# Patient Record
Sex: MALE | Race: WHITE | HISPANIC OR LATINO
[De-identification: names, ages, dates, MRNs, and addresses within clinical notes are randomized per-mention and may not be internally consistent; named-entity substitution may affect disease eponyms.]

---

## 2017-01-25 ENCOUNTER — APPOINTMENT (OUTPATIENT)
Dept: OPHTHALMOLOGY | Facility: CLINIC | Age: 47
End: 2017-01-25

## 2017-01-25 DIAGNOSIS — H40.003 PREGLAUCOMA, UNSPECIFIED, BILATERAL: ICD-10-CM

## 2017-01-25 DIAGNOSIS — L71.9 UNSPECIFIED BLEPHARITIS LEFT EYE, UNSPECIFIED EYELID: ICD-10-CM

## 2017-01-25 DIAGNOSIS — H00.15 CHALAZION LEFT LOWER EYELID: ICD-10-CM

## 2017-01-25 DIAGNOSIS — H01.006 UNSPECIFIED BLEPHARITIS LEFT EYE, UNSPECIFIED EYELID: ICD-10-CM

## 2017-01-25 DIAGNOSIS — H01.003 UNSPECIFIED BLEPHARITIS LEFT EYE, UNSPECIFIED EYELID: ICD-10-CM

## 2017-03-15 ENCOUNTER — APPOINTMENT (OUTPATIENT)
Dept: OPHTHALMOLOGY | Facility: CLINIC | Age: 47
End: 2017-03-15

## 2018-10-26 ENCOUNTER — INPATIENT (INPATIENT)
Facility: HOSPITAL | Age: 48
LOS: 11 days | Discharge: HOME | DRG: 463 | End: 2018-11-07
Attending: ORTHOPAEDIC SURGERY | Admitting: ORTHOPAEDIC SURGERY
Payer: COMMERCIAL

## 2018-10-26 VITALS
HEART RATE: 73 BPM | RESPIRATION RATE: 18 BRPM | SYSTOLIC BLOOD PRESSURE: 154 MMHG | TEMPERATURE: 98 F | OXYGEN SATURATION: 98 % | DIASTOLIC BLOOD PRESSURE: 81 MMHG

## 2018-10-26 DIAGNOSIS — S82.92XA UNSPECIFIED FRACTURE OF LEFT LOWER LEG, INITIAL ENCOUNTER FOR CLOSED FRACTURE: ICD-10-CM

## 2018-10-27 LAB
ANION GAP SERPL CALC-SCNC: 12 MMOL/L — SIGNIFICANT CHANGE UP (ref 5–17)
APTT BLD: 33.7 SEC — SIGNIFICANT CHANGE UP (ref 27.5–37.4)
BASOPHILS # BLD AUTO: 0 K/UL — SIGNIFICANT CHANGE UP (ref 0–0.2)
BASOPHILS NFR BLD AUTO: 0.7 % — SIGNIFICANT CHANGE UP (ref 0–2)
BLD GP AB SCN SERPL QL: NEGATIVE — SIGNIFICANT CHANGE UP
BUN SERPL-MCNC: 18 MG/DL — SIGNIFICANT CHANGE UP (ref 7–23)
CALCIUM SERPL-MCNC: 10.1 MG/DL — SIGNIFICANT CHANGE UP (ref 8.4–10.5)
CHLORIDE SERPL-SCNC: 98 MMOL/L — SIGNIFICANT CHANGE UP (ref 96–108)
CO2 SERPL-SCNC: 28 MMOL/L — SIGNIFICANT CHANGE UP (ref 22–31)
CREAT SERPL-MCNC: 0.96 MG/DL — SIGNIFICANT CHANGE UP (ref 0.5–1.3)
EOSINOPHIL # BLD AUTO: 0.1 K/UL — SIGNIFICANT CHANGE UP (ref 0–0.5)
EOSINOPHIL NFR BLD AUTO: 0.9 % — SIGNIFICANT CHANGE UP (ref 0–6)
GLUCOSE SERPL-MCNC: 101 MG/DL — HIGH (ref 70–99)
HCT VFR BLD CALC: 38.6 % — LOW (ref 39–50)
HGB BLD-MCNC: 13.3 G/DL — SIGNIFICANT CHANGE UP (ref 13–17)
INR BLD: 1.06 RATIO — SIGNIFICANT CHANGE UP (ref 0.88–1.16)
LYMPHOCYTES # BLD AUTO: 2.2 K/UL — SIGNIFICANT CHANGE UP (ref 1–3.3)
LYMPHOCYTES # BLD AUTO: 37.2 % — SIGNIFICANT CHANGE UP (ref 13–44)
MCHC RBC-ENTMCNC: 32.1 PG — SIGNIFICANT CHANGE UP (ref 27–34)
MCHC RBC-ENTMCNC: 34.3 GM/DL — SIGNIFICANT CHANGE UP (ref 32–36)
MCV RBC AUTO: 93.4 FL — SIGNIFICANT CHANGE UP (ref 80–100)
MONOCYTES # BLD AUTO: 0.5 K/UL — SIGNIFICANT CHANGE UP (ref 0–0.9)
MONOCYTES NFR BLD AUTO: 8 % — SIGNIFICANT CHANGE UP (ref 2–14)
NEUTROPHILS # BLD AUTO: 3.2 K/UL — SIGNIFICANT CHANGE UP (ref 1.8–7.4)
NEUTROPHILS NFR BLD AUTO: 53.2 % — SIGNIFICANT CHANGE UP (ref 43–77)
PLATELET # BLD AUTO: 437 K/UL — HIGH (ref 150–400)
POTASSIUM SERPL-MCNC: 4 MMOL/L — SIGNIFICANT CHANGE UP (ref 3.5–5.3)
POTASSIUM SERPL-SCNC: 4 MMOL/L — SIGNIFICANT CHANGE UP (ref 3.5–5.3)
PROTHROM AB SERPL-ACNC: 11.6 SEC — SIGNIFICANT CHANGE UP (ref 9.8–12.7)
RBC # BLD: 4.13 M/UL — LOW (ref 4.2–5.8)
RBC # FLD: 11.5 % — SIGNIFICANT CHANGE UP (ref 10.3–14.5)
RH IG SCN BLD-IMP: POSITIVE — SIGNIFICANT CHANGE UP
SODIUM SERPL-SCNC: 138 MMOL/L — SIGNIFICANT CHANGE UP (ref 135–145)
WBC # BLD: 6 K/UL — SIGNIFICANT CHANGE UP (ref 3.8–10.5)
WBC # FLD AUTO: 6 K/UL — SIGNIFICANT CHANGE UP (ref 3.8–10.5)

## 2018-10-27 PROCEDURE — 73610 X-RAY EXAM OF ANKLE: CPT | Mod: 26,LT

## 2018-10-27 PROCEDURE — 73590 X-RAY EXAM OF LOWER LEG: CPT | Mod: 26,LT

## 2018-10-27 PROCEDURE — 76377 3D RENDER W/INTRP POSTPROCES: CPT | Mod: 26

## 2018-10-27 PROCEDURE — 93010 ELECTROCARDIOGRAM REPORT: CPT

## 2018-10-27 PROCEDURE — 71045 X-RAY EXAM CHEST 1 VIEW: CPT | Mod: 26

## 2018-10-27 PROCEDURE — 73700 CT LOWER EXTREMITY W/O DYE: CPT | Mod: 26,LT

## 2018-10-27 RX ORDER — CEFAZOLIN SODIUM 1 G
1000 VIAL (EA) INJECTION EVERY 8 HOURS
Qty: 0 | Refills: 0 | Status: DISCONTINUED | OUTPATIENT
Start: 2018-10-27 | End: 2018-10-29

## 2018-10-27 RX ORDER — INFLUENZA VIRUS VACCINE 15; 15; 15; 15 UG/.5ML; UG/.5ML; UG/.5ML; UG/.5ML
0.5 SUSPENSION INTRAMUSCULAR ONCE
Qty: 0 | Refills: 0 | Status: DISCONTINUED | OUTPATIENT
Start: 2018-10-27 | End: 2018-11-07

## 2018-10-27 RX ORDER — ZOLPIDEM TARTRATE 10 MG/1
5 TABLET ORAL AT BEDTIME
Qty: 0 | Refills: 0 | Status: DISCONTINUED | OUTPATIENT
Start: 2018-10-27 | End: 2018-10-29

## 2018-10-27 RX ORDER — OXYCODONE HYDROCHLORIDE 5 MG/1
5 TABLET ORAL EVERY 4 HOURS
Qty: 0 | Refills: 0 | Status: DISCONTINUED | OUTPATIENT
Start: 2018-10-27 | End: 2018-10-29

## 2018-10-27 RX ORDER — DOCUSATE SODIUM 100 MG
100 CAPSULE ORAL THREE TIMES A DAY
Qty: 0 | Refills: 0 | Status: DISCONTINUED | OUTPATIENT
Start: 2018-10-27 | End: 2018-10-29

## 2018-10-27 RX ORDER — MAGNESIUM HYDROXIDE 400 MG/1
30 TABLET, CHEWABLE ORAL DAILY
Qty: 0 | Refills: 0 | Status: DISCONTINUED | OUTPATIENT
Start: 2018-10-27 | End: 2018-10-29

## 2018-10-27 RX ORDER — HYDROMORPHONE HYDROCHLORIDE 2 MG/ML
0.5 INJECTION INTRAMUSCULAR; INTRAVENOUS; SUBCUTANEOUS
Qty: 0 | Refills: 0 | Status: DISCONTINUED | OUTPATIENT
Start: 2018-10-27 | End: 2018-10-29

## 2018-10-27 RX ORDER — OXYCODONE HYDROCHLORIDE 5 MG/1
10 TABLET ORAL EVERY 4 HOURS
Qty: 0 | Refills: 0 | Status: DISCONTINUED | OUTPATIENT
Start: 2018-10-27 | End: 2018-10-29

## 2018-10-27 RX ORDER — METOCLOPRAMIDE HCL 10 MG
10 TABLET ORAL EVERY 6 HOURS
Qty: 0 | Refills: 0 | Status: DISCONTINUED | OUTPATIENT
Start: 2018-10-27 | End: 2018-10-29

## 2018-10-27 RX ORDER — ENOXAPARIN SODIUM 100 MG/ML
40 INJECTION SUBCUTANEOUS EVERY 24 HOURS
Qty: 0 | Refills: 0 | Status: DISCONTINUED | OUTPATIENT
Start: 2018-10-28 | End: 2018-10-28

## 2018-10-27 RX ORDER — ACETAMINOPHEN 500 MG
650 TABLET ORAL EVERY 6 HOURS
Qty: 0 | Refills: 0 | Status: DISCONTINUED | OUTPATIENT
Start: 2018-10-27 | End: 2018-10-29

## 2018-10-27 RX ADMIN — Medication 100 MILLIGRAM(S): at 14:44

## 2018-10-27 RX ADMIN — Medication 100 MILLIGRAM(S): at 23:49

## 2018-10-27 RX ADMIN — OXYCODONE HYDROCHLORIDE 10 MILLIGRAM(S): 5 TABLET ORAL at 21:14

## 2018-10-27 RX ADMIN — Medication 100 MILLIGRAM(S): at 06:58

## 2018-10-27 RX ADMIN — Medication 100 MILLIGRAM(S): at 06:51

## 2018-10-27 RX ADMIN — OXYCODONE HYDROCHLORIDE 10 MILLIGRAM(S): 5 TABLET ORAL at 11:45

## 2018-10-27 RX ADMIN — OXYCODONE HYDROCHLORIDE 10 MILLIGRAM(S): 5 TABLET ORAL at 07:42

## 2018-10-27 RX ADMIN — OXYCODONE HYDROCHLORIDE 5 MILLIGRAM(S): 5 TABLET ORAL at 01:40

## 2018-10-27 RX ADMIN — Medication 100 MILLIGRAM(S): at 14:43

## 2018-10-27 RX ADMIN — OXYCODONE HYDROCHLORIDE 10 MILLIGRAM(S): 5 TABLET ORAL at 16:07

## 2018-10-27 RX ADMIN — Medication 100 MILLIGRAM(S): at 23:50

## 2018-10-27 RX ADMIN — OXYCODONE HYDROCHLORIDE 10 MILLIGRAM(S): 5 TABLET ORAL at 16:50

## 2018-10-27 RX ADMIN — OXYCODONE HYDROCHLORIDE 10 MILLIGRAM(S): 5 TABLET ORAL at 06:57

## 2018-10-27 RX ADMIN — OXYCODONE HYDROCHLORIDE 5 MILLIGRAM(S): 5 TABLET ORAL at 00:40

## 2018-10-27 RX ADMIN — OXYCODONE HYDROCHLORIDE 10 MILLIGRAM(S): 5 TABLET ORAL at 20:44

## 2018-10-27 RX ADMIN — OXYCODONE HYDROCHLORIDE 10 MILLIGRAM(S): 5 TABLET ORAL at 11:15

## 2018-10-27 NOTE — H&P ADULT - ATTENDING COMMENTS
Pt seen and examined.  Exam and plan as above.  For staged reconstruction of Gr 3 open distal tib fib fx.
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
## 2018-10-28 ENCOUNTER — TRANSCRIPTION ENCOUNTER (OUTPATIENT)
Age: 48
End: 2018-10-28

## 2018-10-28 RX ORDER — ENOXAPARIN SODIUM 100 MG/ML
40 INJECTION SUBCUTANEOUS DAILY
Qty: 0 | Refills: 0 | Status: COMPLETED | OUTPATIENT
Start: 2018-10-28 | End: 2018-10-28

## 2018-10-28 RX ORDER — MULTIVIT WITH MIN/MFOLATE/K2 340-15/3 G
150 POWDER (GRAM) ORAL ONCE
Qty: 0 | Refills: 0 | Status: COMPLETED | OUTPATIENT
Start: 2018-10-28 | End: 2018-10-28

## 2018-10-28 RX ORDER — MAGNESIUM HYDROXIDE 400 MG/1
30 TABLET, CHEWABLE ORAL ONCE
Qty: 0 | Refills: 0 | Status: DISCONTINUED | OUTPATIENT
Start: 2018-10-28 | End: 2018-10-29

## 2018-10-28 RX ORDER — SODIUM CHLORIDE 9 MG/ML
1000 INJECTION, SOLUTION INTRAVENOUS
Qty: 0 | Refills: 0 | Status: DISCONTINUED | OUTPATIENT
Start: 2018-10-28 | End: 2018-10-29

## 2018-10-28 RX ADMIN — Medication 150 MILLILITER(S): at 08:43

## 2018-10-28 RX ADMIN — OXYCODONE HYDROCHLORIDE 10 MILLIGRAM(S): 5 TABLET ORAL at 15:15

## 2018-10-28 RX ADMIN — ENOXAPARIN SODIUM 40 MILLIGRAM(S): 100 INJECTION SUBCUTANEOUS at 12:46

## 2018-10-28 RX ADMIN — Medication 100 MILLIGRAM(S): at 05:21

## 2018-10-28 RX ADMIN — OXYCODONE HYDROCHLORIDE 10 MILLIGRAM(S): 5 TABLET ORAL at 21:03

## 2018-10-28 RX ADMIN — OXYCODONE HYDROCHLORIDE 10 MILLIGRAM(S): 5 TABLET ORAL at 05:21

## 2018-10-28 RX ADMIN — OXYCODONE HYDROCHLORIDE 10 MILLIGRAM(S): 5 TABLET ORAL at 09:25

## 2018-10-28 RX ADMIN — Medication 100 MILLIGRAM(S): at 14:44

## 2018-10-28 RX ADMIN — OXYCODONE HYDROCHLORIDE 10 MILLIGRAM(S): 5 TABLET ORAL at 10:00

## 2018-10-28 RX ADMIN — Medication 100 MILLIGRAM(S): at 21:02

## 2018-10-28 RX ADMIN — OXYCODONE HYDROCHLORIDE 10 MILLIGRAM(S): 5 TABLET ORAL at 21:33

## 2018-10-28 RX ADMIN — OXYCODONE HYDROCHLORIDE 10 MILLIGRAM(S): 5 TABLET ORAL at 14:45

## 2018-10-28 RX ADMIN — OXYCODONE HYDROCHLORIDE 10 MILLIGRAM(S): 5 TABLET ORAL at 05:51

## 2018-10-28 RX ADMIN — Medication 150 MILLILITER(S): at 18:26
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
## 2018-10-28 NOTE — PROGRESS NOTE ADULT - ASSESSMENT
A/P: 49 y/o M Transferred from [facility] s/p L Ex fix 10/8/18      DVT ppx- Lovenox  NWB LLE  IV abx- Ancef  Regular diet  Mag citrate now  Pain management prn  Possible I&D, Revision Ex-Fix 10/29/18        RUSSEL Hull  Orthopedic Surgery  [phone]

## 2018-10-28 NOTE — PROGRESS NOTE ADULT - SUBJECTIVE AND OBJECTIVE BOX
Patient resting. Requesting pain medication.  No chest pain, SOB, N/V. +Flatus, no BM. Tolerating regular diet.  Requesting medication to move bowels.       T(C): 36.4 (10-28-18 @ 05:00), Max: 36.8 (10-27-18 @ 09:15)  HR: 90 (10-28-18 @ 05:00) (70 - 90)  BP: 139/86 (10-28-18 @ 05:00) (115/74 - 139/86)  RR: 18 (10-28-18 @ 05:00) (18 - 18)  SpO2: 95% (10-28-18 @ 05:00) (95% - 100%)      Exam:  Alert and Oriented, No Acute Distress  Abd: soft, +BS, NTND  Lower Extremities:  LLE: Ex fix in place, compartments soft, toes warm and mobile with brisk cap refill  Calves Soft, Non-tender bilaterally  +PF/DF/EHL/FHL  SILT  +DP Pulse                              13.3   6.0   )-----------( 437      ( 27 Oct 2018 01:05 )             38.6    10-27    138  |  98  |  18  ----------------------------<  101<H>  4.0   |  28  |  0.96    Ca    10.1      27 Oct 2018 01:05

## 2018-10-28 NOTE — PROGRESS NOTE ADULT - ATTENDING COMMENTS
He continues IV Ancef, immobilization with external fixation of the left ankle and wound vac application to the left lateral ankle. Awaiting definitive ankle ORIF with plastics for wound care to follow. The patient is medically stable, medically optimized and has no medical contraindication to surgery tomorrow as required. Exam time 40 minutes including > than 50 % for bedside discussion and counseling.
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
## 2018-10-29 ENCOUNTER — RESULT REVIEW (OUTPATIENT)
Age: 48
End: 2018-10-29

## 2018-10-29 LAB
ANION GAP SERPL CALC-SCNC: 12 MMOL/L — SIGNIFICANT CHANGE UP (ref 5–17)
APTT BLD: 38.1 SEC — HIGH (ref 27.5–37.4)
BLD GP AB SCN SERPL QL: NEGATIVE — SIGNIFICANT CHANGE UP
BUN SERPL-MCNC: 16 MG/DL — SIGNIFICANT CHANGE UP (ref 7–23)
CALCIUM SERPL-MCNC: 10.1 MG/DL — SIGNIFICANT CHANGE UP (ref 8.4–10.5)
CHLORIDE SERPL-SCNC: 97 MMOL/L — SIGNIFICANT CHANGE UP (ref 96–108)
CO2 SERPL-SCNC: 28 MMOL/L — SIGNIFICANT CHANGE UP (ref 22–31)
CREAT SERPL-MCNC: 0.96 MG/DL — SIGNIFICANT CHANGE UP (ref 0.5–1.3)
GLUCOSE SERPL-MCNC: 98 MG/DL — SIGNIFICANT CHANGE UP (ref 70–99)
HCT VFR BLD CALC: 44.3 % — SIGNIFICANT CHANGE UP (ref 39–50)
HGB BLD-MCNC: 15.2 G/DL — SIGNIFICANT CHANGE UP (ref 13–17)
INR BLD: 1.12 RATIO — SIGNIFICANT CHANGE UP (ref 0.88–1.16)
MCHC RBC-ENTMCNC: 31.9 PG — SIGNIFICANT CHANGE UP (ref 27–34)
MCHC RBC-ENTMCNC: 34.4 GM/DL — SIGNIFICANT CHANGE UP (ref 32–36)
MCV RBC AUTO: 92.7 FL — SIGNIFICANT CHANGE UP (ref 80–100)
PLATELET # BLD AUTO: 428 K/UL — HIGH (ref 150–400)
POTASSIUM SERPL-MCNC: 4.5 MMOL/L — SIGNIFICANT CHANGE UP (ref 3.5–5.3)
POTASSIUM SERPL-SCNC: 4.5 MMOL/L — SIGNIFICANT CHANGE UP (ref 3.5–5.3)
PROTHROM AB SERPL-ACNC: 12.2 SEC — SIGNIFICANT CHANGE UP (ref 9.8–12.7)
RBC # BLD: 4.77 M/UL — SIGNIFICANT CHANGE UP (ref 4.2–5.8)
RBC # FLD: 11.6 % — SIGNIFICANT CHANGE UP (ref 10.3–14.5)
RH IG SCN BLD-IMP: POSITIVE — SIGNIFICANT CHANGE UP
SODIUM SERPL-SCNC: 137 MMOL/L — SIGNIFICANT CHANGE UP (ref 135–145)
WBC # BLD: 4.6 K/UL — SIGNIFICANT CHANGE UP (ref 3.8–10.5)
WBC # FLD AUTO: 4.6 K/UL — SIGNIFICANT CHANGE UP (ref 3.8–10.5)

## 2018-10-29 PROCEDURE — 88311 DECALCIFY TISSUE: CPT | Mod: 26

## 2018-10-29 PROCEDURE — 73706 CT ANGIO LWR EXTR W/O&W/DYE: CPT | Mod: 26,LT

## 2018-10-29 PROCEDURE — 88304 TISSUE EXAM BY PATHOLOGIST: CPT | Mod: 26

## 2018-10-29 PROCEDURE — 11012 DEB SKIN BONE AT FX SITE: CPT | Mod: LT

## 2018-10-29 PROCEDURE — 27825 TREAT LOWER LEG FRACTURE: CPT | Mod: LT

## 2018-10-29 PROCEDURE — 20692 APPL MLTPLN UNI EXT FIXJ SYS: CPT | Mod: LT

## 2018-10-29 RX ORDER — DOCUSATE SODIUM 100 MG
100 CAPSULE ORAL THREE TIMES A DAY
Qty: 0 | Refills: 0 | Status: DISCONTINUED | OUTPATIENT
Start: 2018-10-29 | End: 2018-11-01

## 2018-10-29 RX ORDER — GENTAMICIN SULFATE 40 MG/ML
80 VIAL (ML) INJECTION EVERY 8 HOURS
Qty: 0 | Refills: 0 | Status: DISCONTINUED | OUTPATIENT
Start: 2018-10-29 | End: 2018-11-01

## 2018-10-29 RX ORDER — HYDROMORPHONE HYDROCHLORIDE 2 MG/ML
0.5 INJECTION INTRAMUSCULAR; INTRAVENOUS; SUBCUTANEOUS
Qty: 0 | Refills: 0 | Status: DISCONTINUED | OUTPATIENT
Start: 2018-10-29 | End: 2018-10-29

## 2018-10-29 RX ORDER — OXYCODONE HYDROCHLORIDE 5 MG/1
5 TABLET ORAL EVERY 4 HOURS
Qty: 0 | Refills: 0 | Status: DISCONTINUED | OUTPATIENT
Start: 2018-10-29 | End: 2018-11-01

## 2018-10-29 RX ORDER — ACETAMINOPHEN 500 MG
650 TABLET ORAL EVERY 6 HOURS
Qty: 0 | Refills: 0 | Status: DISCONTINUED | OUTPATIENT
Start: 2018-10-29 | End: 2018-11-01

## 2018-10-29 RX ORDER — FAMOTIDINE 10 MG/ML
20 INJECTION INTRAVENOUS EVERY 12 HOURS
Qty: 0 | Refills: 0 | Status: DISCONTINUED | OUTPATIENT
Start: 2018-10-29 | End: 2018-11-01

## 2018-10-29 RX ORDER — HYDROMORPHONE HYDROCHLORIDE 2 MG/ML
0.5 INJECTION INTRAMUSCULAR; INTRAVENOUS; SUBCUTANEOUS EVERY 6 HOURS
Qty: 0 | Refills: 0 | Status: DISCONTINUED | OUTPATIENT
Start: 2018-10-29 | End: 2018-11-01

## 2018-10-29 RX ORDER — ONDANSETRON 8 MG/1
4 TABLET, FILM COATED ORAL EVERY 6 HOURS
Qty: 0 | Refills: 0 | Status: DISCONTINUED | OUTPATIENT
Start: 2018-10-29 | End: 2018-11-01

## 2018-10-29 RX ORDER — SODIUM CHLORIDE 9 MG/ML
1000 INJECTION, SOLUTION INTRAVENOUS
Qty: 0 | Refills: 0 | Status: DISCONTINUED | OUTPATIENT
Start: 2018-10-29 | End: 2018-10-31

## 2018-10-29 RX ORDER — MAGNESIUM HYDROXIDE 400 MG/1
30 TABLET, CHEWABLE ORAL DAILY
Qty: 0 | Refills: 0 | Status: DISCONTINUED | OUTPATIENT
Start: 2018-10-29 | End: 2018-11-01

## 2018-10-29 RX ORDER — ENOXAPARIN SODIUM 100 MG/ML
40 INJECTION SUBCUTANEOUS EVERY 24 HOURS
Qty: 0 | Refills: 0 | Status: DISCONTINUED | OUTPATIENT
Start: 2018-10-29 | End: 2018-10-30

## 2018-10-29 RX ORDER — ZOLPIDEM TARTRATE 10 MG/1
5 TABLET ORAL AT BEDTIME
Qty: 0 | Refills: 0 | Status: DISCONTINUED | OUTPATIENT
Start: 2018-10-29 | End: 2018-11-01

## 2018-10-29 RX ORDER — ONDANSETRON 8 MG/1
4 TABLET, FILM COATED ORAL ONCE
Qty: 0 | Refills: 0 | Status: DISCONTINUED | OUTPATIENT
Start: 2018-10-29 | End: 2018-10-29

## 2018-10-29 RX ORDER — CEFAZOLIN SODIUM 1 G
1000 VIAL (EA) INJECTION EVERY 8 HOURS
Qty: 0 | Refills: 0 | Status: DISCONTINUED | OUTPATIENT
Start: 2018-10-29 | End: 2018-11-01

## 2018-10-29 RX ORDER — OXYCODONE HYDROCHLORIDE 5 MG/1
10 TABLET ORAL EVERY 4 HOURS
Qty: 0 | Refills: 0 | Status: DISCONTINUED | OUTPATIENT
Start: 2018-10-29 | End: 2018-11-01

## 2018-10-29 RX ADMIN — SODIUM CHLORIDE 100 MILLILITER(S): 9 INJECTION, SOLUTION INTRAVENOUS at 00:27

## 2018-10-29 RX ADMIN — OXYCODONE HYDROCHLORIDE 10 MILLIGRAM(S): 5 TABLET ORAL at 19:44

## 2018-10-29 RX ADMIN — HYDROMORPHONE HYDROCHLORIDE 0.5 MILLIGRAM(S): 2 INJECTION INTRAMUSCULAR; INTRAVENOUS; SUBCUTANEOUS at 12:15

## 2018-10-29 RX ADMIN — Medication 100 MILLIGRAM(S): at 23:31

## 2018-10-29 RX ADMIN — ENOXAPARIN SODIUM 40 MILLIGRAM(S): 100 INJECTION SUBCUTANEOUS at 18:55

## 2018-10-29 RX ADMIN — OXYCODONE HYDROCHLORIDE 10 MILLIGRAM(S): 5 TABLET ORAL at 05:21

## 2018-10-29 RX ADMIN — OXYCODONE HYDROCHLORIDE 10 MILLIGRAM(S): 5 TABLET ORAL at 05:51

## 2018-10-29 RX ADMIN — OXYCODONE HYDROCHLORIDE 10 MILLIGRAM(S): 5 TABLET ORAL at 16:12

## 2018-10-29 RX ADMIN — FAMOTIDINE 20 MILLIGRAM(S): 10 INJECTION INTRAVENOUS at 18:55

## 2018-10-29 RX ADMIN — SODIUM CHLORIDE 100 MILLILITER(S): 9 INJECTION, SOLUTION INTRAVENOUS at 12:57

## 2018-10-29 RX ADMIN — Medication 100 MILLIGRAM(S): at 15:44

## 2018-10-29 RX ADMIN — Medication 100 MILLIGRAM(S): at 18:55

## 2018-10-29 RX ADMIN — Medication 100 MILLIGRAM(S): at 05:21

## 2018-10-29 RX ADMIN — HYDROMORPHONE HYDROCHLORIDE 0.5 MILLIGRAM(S): 2 INJECTION INTRAMUSCULAR; INTRAVENOUS; SUBCUTANEOUS at 12:30

## 2018-10-29 RX ADMIN — HYDROMORPHONE HYDROCHLORIDE 0.5 MILLIGRAM(S): 2 INJECTION INTRAMUSCULAR; INTRAVENOUS; SUBCUTANEOUS at 12:45

## 2018-10-29 RX ADMIN — Medication 650 MILLIGRAM(S): at 19:24

## 2018-10-29 RX ADMIN — Medication 650 MILLIGRAM(S): at 18:54

## 2018-10-29 RX ADMIN — OXYCODONE HYDROCHLORIDE 10 MILLIGRAM(S): 5 TABLET ORAL at 15:42

## 2018-10-29 RX ADMIN — OXYCODONE HYDROCHLORIDE 10 MILLIGRAM(S): 5 TABLET ORAL at 20:04

## 2018-10-29 RX ADMIN — HYDROMORPHONE HYDROCHLORIDE 0.5 MILLIGRAM(S): 2 INJECTION INTRAMUSCULAR; INTRAVENOUS; SUBCUTANEOUS at 12:28
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
## 2018-10-29 NOTE — PROGRESS NOTE ADULT - ASSESSMENT
A/P: 49 y/o M Transferred from [facility] s/p L Ex fix 10/8/18    - DVT ppx- Lovenox  - NWB LLE  - IV abx- Ancef  - Regular diet  - Pain management prn  - I&D, Revision Ex-Fix 10/29/18  - NPO for OR  - IVF while NPO

## 2018-10-29 NOTE — PROGRESS NOTE ADULT - SUBJECTIVE AND OBJECTIVE BOX
ORTHO ATTENDING POST OP    s/p I and D, revision ex fix and application VAC L open distal tib-fib fx  Ancef 1g q8 until definitive wound closure  Lovenox  venodynes  CTA LLE to assess distal circulation  plastics consult- Dora  f/u cx x 2  vac change, possible ORIF in OR thursday  CBC in RR and AM

## 2018-10-29 NOTE — PROGRESS NOTE ADULT - ASSESSMENT
S/P open left ankle fracture [place] ([facility]) on 10/8  S/P I and D  with Ext Fixation on 10/9  S/P readmission 10/24 for revision and wound debridement.He had attempt at ORIF, aborted as he   Wound debrided at [place] on 10/25 and sent to [facility]  S/P Operative debridement, revision of fixator today, and wound vac placement  He is on day 5 cefazolin, has also  received gentamicin.  Will limit gentamicin  Will look to prior and present cultures to guide therapy  Will try again at prior micro lab to try to access cultures.
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
## 2018-10-29 NOTE — PROGRESS NOTE ADULT - SUBJECTIVE AND OBJECTIVE BOX
CC: f/u for open left ankle pilon fracture    Patient reports: he is s/p debridement, adjustment of external fixator, and wound vac placement.    REVIEW OF SYSTEMS:  All other review of systems negative (Comprehensive ROS)    Antimicrobials Day #  :day 5  ceFAZolin   IVPB 1000 milliGRAM(s) IV Intermittent every 8 hours  gentamicin   IVPB 80 milliGRAM(s) IV Intermittent every 8 hours    Other Medications Reviewed    T(F): 97.4 (10-29-18 @ 16:35), Max: 98.2 (10-29-18 @ 00:30)  HR: 101 (10-29-18 @ 16:35)  BP: 140/88 (10-29-18 @ 16:35)  RR: 19 (10-29-18 @ 16:35)  SpO2: 97% (10-29-18 @ 16:35)  Wt(kg): --    PHYSICAL EXAM:  General: alert, no acute distress  Eyes:  anicteric, no conjunctival injection, no discharge  Oropharynx: no lesions or injection 	  Neck: supple, without adenopathy  Lungs: clear to auscultation  Heart: regular rate and rhythm; no murmur, rubs or gallops  Abdomen: soft, nondistended, nontender, without mass or organomegaly  Skin: no lesions  Extremities: no clubbing, cyanosis, or edema  Neurologic: alert, oriented, moves all extremities  Left ankle with external fixation device and wound vac in place  LAB RESULTS:                        15.2   4.6   )-----------( 428      ( 29 Oct 2018 04:41 )             44.3     10-29    137  |  97  |  16  ----------------------------<  98  4.5   |  28  |  0.96    Ca    10.1      29 Oct 2018 04:42          MICROBIOLOGY:  RECENT CULTURES:  pending , [facility] lab [phone] would no give update without specimen numbers    RADIOLOGY REVIEWED:    < from: CT 3D Reconstruct w/ Workstation (10.27.18 @ 14:03) >    IMPRESSION:  1.  Patient status post severely comminuted pilon fracture of the distal   tibia.  2.  Oblique fracture of the distal fibula with lateral displacement of   the distal fracture fragment.  3.  Nondisplaced fractures of the Stieda process of the talus, calcaneus,   and navicular.    < end of copied text >

## 2018-10-29 NOTE — PROGRESS NOTE ADULT - ATTENDING COMMENTS
I am a non participating [employer] physician seeing Pt in coverage for Dr Cuevas I am a non participating [facility] physician seeing Pt in coverage for Dr Cuevas. The above patient examination was reviewed with Dr. Mesa and I agree with his evaluation, assessment and treatment plan.  Jonas Cuevas M.D.

## 2018-10-29 NOTE — CHART NOTE - NSCHARTNOTEFT_GEN_A_CORE
Patient resting without complaints.  Denies chest pain, SOB, N/V.    T(C): 36.8 (10-29-18 @ 14:30)  T(F): 98.2 (10-29-18 @ 14:30)  HR: 98 (10-29-18 @ 14:30)  BP: 142/89 (10-29-18 @ 14:30)  RR: 19 (10-29-18 @ 14:30)  SpO2: 97% (10-29-18 @ 14:30)      Exam:  Alert and oriented; No acute distress    L lower extremity:  Ex-fix in place without drainage from pin sites; Dsg CDI  Wound vac in place with good suction  Calf soft, NT  +EHL/FHL  Decreased sensation to 1st interweb space, otherwise grossly intact  Digits WWP; Cap refill <2secs                                                                                                  15.2   4.6   )-----------( 428      ( 29 Oct 2018 04:41 )             44.3        137  |  97  |  16  ----------------------------<  98  4.5   |  28  |  0.96      A/P: 48y Male s/p LLE I&D, revision of ex-fix and application of wound VAC; Stable  -Pain control; Ice prn; Elevate  -DVT ppx; IS  -Am labs  -PT eval: NWB LLE  -Continue current tx.    Michelle Pratt PA-C  Orthopedic Surgery  [facility] [phone]

## 2018-10-29 NOTE — BRIEF OPERATIVE NOTE - PROCEDURE
<<-----Click on this checkbox to enter Procedure Irrigation and debridement of wound of left ankle  10/29/2018    Active  [id number]  Application of wound vacuum assisted closure device  10/29/2018    [id number]  Application of external fixator device to ankle  10/29/2018  Left  Active  [id number]  Removal of external fixator  10/29/2018    [facility]  [id number]

## 2018-10-29 NOTE — PROGRESS NOTE ADULT - SUBJECTIVE AND OBJECTIVE BOX
Orthopedic Surgery Progress Note     S: Patient seen and examined today. No acute events overnight. Pain is well controlled. Denies f/c, chest pain, shortness of breath, dizziness. Plan for OR today.    MEDICATIONS  (STANDING):  ceFAZolin   IVPB 1000 milliGRAM(s) IV Intermittent every 8 hours  docusate sodium 100 milliGRAM(s) Oral three times a day  influenza   Vaccine 0.5 milliLiter(s) IntraMuscular once  lactated ringers. 1000 milliLiter(s) (100 mL/Hr) IV Continuous <Continuous>  magnesium hydroxide Suspension 30 milliLiter(s) Oral once    MEDICATIONS  (PRN):  acetaminophen   Tablet .. 650 milliGRAM(s) Oral every 6 hours PRN Temp greater or equal to 38C (100.4F), Mild Pain (1 - 3)  HYDROmorphone  Injectable 0.5 milliGRAM(s) IV Push every 3 hours PRN Breakthrough Pain  magnesium hydroxide Suspension 30 milliLiter(s) Oral daily PRN Constipation  metoclopramide Injectable 10 milliGRAM(s) IV Push every 6 hours PRN Nausea and/or Vomiting  oxyCODONE    IR 10 milliGRAM(s) Oral every 4 hours PRN Severe Pain (7 - 10)  oxyCODONE    IR 5 milliGRAM(s) Oral every 4 hours PRN Moderate Pain (4 - 6)  zolpidem 5 milliGRAM(s) Oral at bedtime PRN Insomnia      Physical Exam:    Vital Signs Last 24 Hrs  T(C): 36.7 (29 Oct 2018 04:37), Max: 36.8 (29 Oct 2018 00:30)  T(F): 98.1 (29 Oct 2018 04:37), Max: 98.2 (29 Oct 2018 00:30)  HR: 77 (29 Oct 2018 04:37) (73 - 82)  BP: 122/77 (29 Oct 2018 04:37) (113/80 - 126/76)  BP(mean): --  RR: 18 (29 Oct 2018 04:37) (18 - 18)  SpO2: 94% (29 Oct 2018 04:37) (94% - 99%)    10-27-18 @ 07:01  -  10-28-18 @ 07:00  --------------------------------------------------------  IN: 1660 mL / OUT: 1251 mL / NET: 409 mL    10-28-18 @ 07:01  -  10-29-18 @ 06:31  --------------------------------------------------------  IN: 1257 mL / OUT: 1950 mL / NET: -693 mL        Alert and Oriented, No Acute Distress  Lower Extremities:  LLE: Ex fix in place, dressing intact. compartments soft, toes warm and mobile with brisk cap refill  Calves Soft, Non-tender bilaterally  +PF/DF/EHL/FHL  SILT  +DP Pulse        LABS:                        15.2   4.6   )-----------( 428      ( 29 Oct 2018 04:41 )             44.3     10-29    137  |  97  |  16  ----------------------------<  98  4.5   |  28  |  0.96    Ca    10.1      29 Oct 2018 04:42

## 2018-10-29 NOTE — BRIEF OPERATIVE NOTE - OPERATION/FINDINGS
Removal of ex-fix; I&D of lateral grade III open fracture wound. Wound Vac application. Placement of external fixator

## 2018-10-30 LAB
ANION GAP SERPL CALC-SCNC: 14 MMOL/L — SIGNIFICANT CHANGE UP (ref 5–17)
BASOPHILS # BLD AUTO: 0.01 K/UL — SIGNIFICANT CHANGE UP (ref 0–0.2)
BASOPHILS NFR BLD AUTO: 0.1 % — SIGNIFICANT CHANGE UP (ref 0–2)
BUN SERPL-MCNC: 14 MG/DL — SIGNIFICANT CHANGE UP (ref 7–23)
CALCIUM SERPL-MCNC: 9.5 MG/DL — SIGNIFICANT CHANGE UP (ref 8.4–10.5)
CHLORIDE SERPL-SCNC: 95 MMOL/L — LOW (ref 96–108)
CO2 SERPL-SCNC: 26 MMOL/L — SIGNIFICANT CHANGE UP (ref 22–31)
CREAT SERPL-MCNC: 0.91 MG/DL — SIGNIFICANT CHANGE UP (ref 0.5–1.3)
EOSINOPHIL # BLD AUTO: 0.03 K/UL — SIGNIFICANT CHANGE UP (ref 0–0.5)
EOSINOPHIL NFR BLD AUTO: 0.4 % — SIGNIFICANT CHANGE UP (ref 0–6)
GLUCOSE SERPL-MCNC: 128 MG/DL — HIGH (ref 70–99)
HCT VFR BLD CALC: 39.8 % — SIGNIFICANT CHANGE UP (ref 39–50)
HGB BLD-MCNC: 13.6 G/DL — SIGNIFICANT CHANGE UP (ref 13–17)
IMM GRANULOCYTES NFR BLD AUTO: 0.1 % — SIGNIFICANT CHANGE UP (ref 0–1.5)
LYMPHOCYTES # BLD AUTO: 2.04 K/UL — SIGNIFICANT CHANGE UP (ref 1–3.3)
LYMPHOCYTES # BLD AUTO: 24.8 % — SIGNIFICANT CHANGE UP (ref 13–44)
MCHC RBC-ENTMCNC: 32.2 PG — SIGNIFICANT CHANGE UP (ref 27–34)
MCHC RBC-ENTMCNC: 34.2 GM/DL — SIGNIFICANT CHANGE UP (ref 32–36)
MCV RBC AUTO: 94.1 FL — SIGNIFICANT CHANGE UP (ref 80–100)
MONOCYTES # BLD AUTO: 0.96 K/UL — HIGH (ref 0–0.9)
MONOCYTES NFR BLD AUTO: 11.7 % — SIGNIFICANT CHANGE UP (ref 2–14)
NEUTROPHILS # BLD AUTO: 5.16 K/UL — SIGNIFICANT CHANGE UP (ref 1.8–7.4)
NEUTROPHILS NFR BLD AUTO: 62.9 % — SIGNIFICANT CHANGE UP (ref 43–77)
PLATELET # BLD AUTO: 373 K/UL — SIGNIFICANT CHANGE UP (ref 150–400)
POTASSIUM SERPL-MCNC: 3.8 MMOL/L — SIGNIFICANT CHANGE UP (ref 3.5–5.3)
POTASSIUM SERPL-SCNC: 3.8 MMOL/L — SIGNIFICANT CHANGE UP (ref 3.5–5.3)
RBC # BLD: 4.23 M/UL — SIGNIFICANT CHANGE UP (ref 4.2–5.8)
RBC # FLD: 12.1 % — SIGNIFICANT CHANGE UP (ref 10.3–14.5)
SODIUM SERPL-SCNC: 135 MMOL/L — SIGNIFICANT CHANGE UP (ref 135–145)
WBC # BLD: 8.21 K/UL — SIGNIFICANT CHANGE UP (ref 3.8–10.5)
WBC # FLD AUTO: 8.21 K/UL — SIGNIFICANT CHANGE UP (ref 3.8–10.5)

## 2018-10-30 RX ORDER — ENOXAPARIN SODIUM 100 MG/ML
40 INJECTION SUBCUTANEOUS EVERY 24 HOURS
Qty: 0 | Refills: 0 | Status: COMPLETED | OUTPATIENT
Start: 2018-10-30 | End: 2018-10-31

## 2018-10-30 RX ADMIN — Medication 100 MILLIGRAM(S): at 21:43

## 2018-10-30 RX ADMIN — Medication 100 MILLIGRAM(S): at 05:22

## 2018-10-30 RX ADMIN — Medication 100 MILLIGRAM(S): at 13:01

## 2018-10-30 RX ADMIN — FAMOTIDINE 20 MILLIGRAM(S): 10 INJECTION INTRAVENOUS at 17:47

## 2018-10-30 RX ADMIN — Medication 100 MILLIGRAM(S): at 13:00

## 2018-10-30 RX ADMIN — OXYCODONE HYDROCHLORIDE 10 MILLIGRAM(S): 5 TABLET ORAL at 12:57

## 2018-10-30 RX ADMIN — Medication 100 MILLIGRAM(S): at 16:24

## 2018-10-30 RX ADMIN — OXYCODONE HYDROCHLORIDE 10 MILLIGRAM(S): 5 TABLET ORAL at 13:30

## 2018-10-30 RX ADMIN — SODIUM CHLORIDE 100 MILLILITER(S): 9 INJECTION, SOLUTION INTRAVENOUS at 05:26

## 2018-10-30 RX ADMIN — OXYCODONE HYDROCHLORIDE 5 MILLIGRAM(S): 5 TABLET ORAL at 05:25

## 2018-10-30 RX ADMIN — OXYCODONE HYDROCHLORIDE 10 MILLIGRAM(S): 5 TABLET ORAL at 01:00

## 2018-10-30 RX ADMIN — OXYCODONE HYDROCHLORIDE 5 MILLIGRAM(S): 5 TABLET ORAL at 06:00

## 2018-10-30 RX ADMIN — Medication 100 MILLIGRAM(S): at 00:27

## 2018-10-30 RX ADMIN — Medication 650 MILLIGRAM(S): at 05:22

## 2018-10-30 RX ADMIN — OXYCODONE HYDROCHLORIDE 10 MILLIGRAM(S): 5 TABLET ORAL at 21:33

## 2018-10-30 RX ADMIN — OXYCODONE HYDROCHLORIDE 10 MILLIGRAM(S): 5 TABLET ORAL at 00:27

## 2018-10-30 RX ADMIN — OXYCODONE HYDROCHLORIDE 10 MILLIGRAM(S): 5 TABLET ORAL at 22:03

## 2018-10-30 RX ADMIN — FAMOTIDINE 20 MILLIGRAM(S): 10 INJECTION INTRAVENOUS at 05:22

## 2018-10-30 RX ADMIN — Medication 650 MILLIGRAM(S): at 06:00

## 2018-10-30 RX ADMIN — ENOXAPARIN SODIUM 40 MILLIGRAM(S): 100 INJECTION SUBCUTANEOUS at 17:47

## 2018-10-30 RX ADMIN — Medication 100 MILLIGRAM(S): at 07:50

## 2018-10-30 NOTE — PHYSICAL THERAPY INITIAL EVALUATION ADULT - PLANNED THERAPY INTERVENTIONS, PT EVAL
balance training/Stair training... GOAL: In 2 weeks pt will negotiate 3 stairs independently with least restrictive device./transfer training/strengthening/bed mobility training/gait training

## 2018-10-30 NOTE — PHYSICAL THERAPY INITIAL EVALUATION ADULT - GENERAL OBSERVATIONS, REHAB EVAL
Pt tolerated 45min PT initial evaluation well. Pt s/p L open pilon fx, POD1 I&D LLE with exfix, NWB (maintained). Pt rec'd in bed in NAD, +IVL, exfix LLE.

## 2018-10-30 NOTE — PHYSICAL THERAPY INITIAL EVALUATION ADULT - PERTINENT HX OF CURRENT PROBLEM, REHAB EVAL
49 y/o M Transferred from [facility] s/p L open pilon fx, with Ex fix 10/8/18. S/P I&D, revision exfix, and wound vac placement

## 2018-10-30 NOTE — PROGRESS NOTE ADULT - SUBJECTIVE AND OBJECTIVE BOX
CC: f/u for left ankle injury    Patient reports: he is comfortable in bed.ORIF planned for 11/1.I have contacted micro at [facility], all bacterial cultures were finalized as negative.[facility] culture set [phone].I have spoken with [facility] lab and they will contact me if there is any growth.    REVIEW OF SYSTEMS:  All other review of systems negative (Comprehensive ROS)    Antimicrobials Day #  :POD 1  ceFAZolin   IVPB 1000 milliGRAM(s) IV Intermittent every 8 hours  gentamicin   IVPB 80 milliGRAM(s) IV Intermittent every 8 hours    Other Medications Reviewed    T(F): 98.5 (10-30-18 @ 09:16), Max: 98.5 (10-30-18 @ 09:16)  HR: 81 (10-30-18 @ 09:16)  BP: 118/75 (10-30-18 @ 09:16)  RR: 18 (10-30-18 @ 09:16)  SpO2: 97% (10-30-18 @ 09:16)  Wt(kg): --    PHYSICAL EXAM:  General: alert, no acute distress  Eyes:  anicteric, no conjunctival injection, no discharge  Oropharynx: no lesions or injection 	  Neck: supple, without adenopathy  Lungs: clear to auscultation  Heart: regular rate and rhythm; no murmur, rubs or gallops  Abdomen: soft, nondistended, nontender, without mass or organomegaly  Skin: no lesions  Extremities: no clubbing, cyanosis, or edema  Neurologic: alert, oriented, moves all extremities  Left leg in external fixator with wound vac  LAB RESULTS:                        13.6   8.21  )-----------( 373      ( 30 Oct 2018 08:07 )             39.8     10-30    135  |  95<L>  |  14  ----------------------------<  128<H>  3.8   |  26  |  0.91    Ca    9.5      30 Oct 2018 06:45          MICROBIOLOGY:  RECENT CULTURES:  10/24 routine all negative    RADIOLOGY REVIEWED:  < from: CT Ankle No Cont, Left (10.27.18 @ 13:56) >  IMPRESSION:  1.  Patient status post severely comminuted pilon fracture of the distal   tibia.  2.  Oblique fracture of the distal fibula with lateral displacement of   the distal fracture fragment.  3.  Nondisplaced fractures of the Stieda process of the talus, calcaneus,   and navicular.

## 2018-10-30 NOTE — PHYSICAL THERAPY INITIAL EVALUATION ADULT - ADDITIONAL COMMENTS
Pt reports he was independent with all functional mobility prior to first exfix. Pt lives in an apartment with his wife and step son with 3 steps to enter. Pt has axillary crutches from previous hospitalization.

## 2018-10-30 NOTE — PROGRESS NOTE ADULT - ASSESSMENT
A/P: 49 y/o M Transferred from [facility] s/p L Ex fix 10/8/18. POD#1 from I&D, revision exfix, and wound vac placement    - DVT ppx- Lovenox  - NWB LLE  - IV abx- Ancef + gent  - Regular diet  - Pain management prn  - Plan for ORIF 11/1.  - needs consent, booking, preop, clearance
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
## 2018-10-30 NOTE — PROGRESS NOTE ADULT - SUBJECTIVE AND OBJECTIVE BOX
Orthopedic Surgery Progress Note     S: Patient seen and examined today. No acute events overnight. Pain is well controlled. Denies f/c, chest pain, shortness of breath, dizziness. Plan for OR Thursday    MEDICATIONS  (STANDING):  ceFAZolin   IVPB 1000 milliGRAM(s) IV Intermittent every 8 hours  docusate sodium 100 milliGRAM(s) Oral three times a day  enoxaparin Injectable 40 milliGRAM(s) SubCutaneous every 24 hours  famotidine    Tablet 20 milliGRAM(s) Oral every 12 hours  gentamicin   IVPB 80 milliGRAM(s) IV Intermittent every 8 hours  influenza   Vaccine 0.5 milliLiter(s) IntraMuscular once  lactated ringers. 1000 milliLiter(s) (100 mL/Hr) IV Continuous <Continuous>    MEDICATIONS  (PRN):  acetaminophen   Tablet .. 650 milliGRAM(s) Oral every 6 hours PRN Temp greater or equal to 38C (100.4F), Mild Pain (1 - 3)  HYDROmorphone  Injectable 0.5 milliGRAM(s) IV Push every 6 hours PRN breakthrough pain  magnesium hydroxide Suspension 30 milliLiter(s) Oral daily PRN Constipation  ondansetron Injectable 4 milliGRAM(s) IV Push every 6 hours PRN Nausea and/or Vomiting  oxyCODONE    IR 10 milliGRAM(s) Oral every 4 hours PRN Severe Pain (7 - 10)  oxyCODONE    IR 5 milliGRAM(s) Oral every 4 hours PRN Moderate Pain (4 - 6)  zolpidem 5 milliGRAM(s) Oral at bedtime PRN Insomnia      Physical Exam:    Vital Signs Last 24 Hrs  T(C): 36.8 (30 Oct 2018 04:36), Max: 36.9 (29 Oct 2018 17:35)  T(F): 98.3 (30 Oct 2018 04:36), Max: 98.4 (29 Oct 2018 17:35)  HR: 82 (30 Oct 2018 04:36) (73 - 103)  BP: 122/79 (30 Oct 2018 04:36) (120/81 - 161/85)  BP(mean): 113 (29 Oct 2018 14:00) (106 - 114)  RR: 18 (30 Oct 2018 04:36) (14 - 19)  SpO2: 98% (30 Oct 2018 04:36) (93% - 100%)    10-28-18 @ 07:01  -  10-29-18 @ 07:00  --------------------------------------------------------  IN: 1257 mL / OUT: 1950 mL / NET: -693 mL    10-29-18 @ 07:01  -  10-30-18 @ 06:38  --------------------------------------------------------  IN: 925 mL / OUT: 2275 mL / NET: -1350 mL        Gen: NAD  LLE: Ex fix in place, dressing intact. compartments soft, toes warm and mobile with brisk cap refill. Wound vac intact  Calves Soft, Non-tender bilaterally  +PF/DF/EHL/FHL  SILT  +DP Pulse          LABS:                        15.2   4.6   )-----------( 428      ( 29 Oct 2018 04:41 )             44.3     10-29    137  |  97  |  16  ----------------------------<  98  4.5   |  28  |  0.96    Ca    10.1      29 Oct 2018 04:42
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
## 2018-10-30 NOTE — PROGRESS NOTE ADULT - ATTENDING COMMENTS
I am a non participating [facility] physician seeing Pt in coverage for Dr Cuevas. The above patient examination was reviewed with Dr. Mesa and I agree with his evaluation, assessment and treatment plan.  Jonas Cuevas M.D. Patient continues  immobilization with external fixation and wound VAC to open left ankle ulcer area continues IV antibiotics  pending surgical stabilization of the ankle fracture and followed by multiple surgeries for graft closure. Patient encouraged to maintain a high protein low carbohydrate diet In preparation for onset of surgeries later this week.

## 2018-10-31 ENCOUNTER — TRANSCRIPTION ENCOUNTER (OUTPATIENT)
Age: 48
End: 2018-10-31

## 2018-10-31 DIAGNOSIS — B99.9 UNSPECIFIED INFECTIOUS DISEASE: ICD-10-CM

## 2018-10-31 DIAGNOSIS — S82.892S OTHER FRACTURE OF LEFT LOWER LEG, SEQUELA: ICD-10-CM

## 2018-10-31 LAB
ANION GAP SERPL CALC-SCNC: 11 MMOL/L — SIGNIFICANT CHANGE UP (ref 5–17)
BUN SERPL-MCNC: 17 MG/DL — SIGNIFICANT CHANGE UP (ref 7–23)
CALCIUM SERPL-MCNC: 9.8 MG/DL — SIGNIFICANT CHANGE UP (ref 8.4–10.5)
CHLORIDE SERPL-SCNC: 96 MMOL/L — SIGNIFICANT CHANGE UP (ref 96–108)
CO2 SERPL-SCNC: 29 MMOL/L — SIGNIFICANT CHANGE UP (ref 22–31)
CREAT SERPL-MCNC: 0.97 MG/DL — SIGNIFICANT CHANGE UP (ref 0.5–1.3)
GLUCOSE SERPL-MCNC: 99 MG/DL — SIGNIFICANT CHANGE UP (ref 70–99)
HCT VFR BLD CALC: 38.8 % — LOW (ref 39–50)
HGB BLD-MCNC: 13.4 G/DL — SIGNIFICANT CHANGE UP (ref 13–17)
MCHC RBC-ENTMCNC: 32.6 PG — SIGNIFICANT CHANGE UP (ref 27–34)
MCHC RBC-ENTMCNC: 34.5 GM/DL — SIGNIFICANT CHANGE UP (ref 32–36)
MCV RBC AUTO: 94.4 FL — SIGNIFICANT CHANGE UP (ref 80–100)
PLATELET # BLD AUTO: 319 K/UL — SIGNIFICANT CHANGE UP (ref 150–400)
POTASSIUM SERPL-MCNC: 4.2 MMOL/L — SIGNIFICANT CHANGE UP (ref 3.5–5.3)
POTASSIUM SERPL-SCNC: 4.2 MMOL/L — SIGNIFICANT CHANGE UP (ref 3.5–5.3)
RBC # BLD: 4.11 M/UL — LOW (ref 4.2–5.8)
RBC # FLD: 12.5 % — SIGNIFICANT CHANGE UP (ref 10.3–14.5)
SODIUM SERPL-SCNC: 136 MMOL/L — SIGNIFICANT CHANGE UP (ref 135–145)
WBC # BLD: 4.97 K/UL — SIGNIFICANT CHANGE UP (ref 3.8–10.5)
WBC # FLD AUTO: 4.97 K/UL — SIGNIFICANT CHANGE UP (ref 3.8–10.5)

## 2018-10-31 RX ORDER — SODIUM CHLORIDE 9 MG/ML
1000 INJECTION, SOLUTION INTRAVENOUS
Qty: 0 | Refills: 0 | Status: DISCONTINUED | OUTPATIENT
Start: 2018-11-01 | End: 2018-11-01

## 2018-10-31 RX ORDER — CHLORHEXIDINE GLUCONATE 213 G/1000ML
1 SOLUTION TOPICAL
Qty: 0 | Refills: 0 | Status: COMPLETED | OUTPATIENT
Start: 2018-10-31 | End: 2018-11-01

## 2018-10-31 RX ADMIN — Medication 100 MILLIGRAM(S): at 05:33

## 2018-10-31 RX ADMIN — Medication 100 MILLIGRAM(S): at 08:04

## 2018-10-31 RX ADMIN — OXYCODONE HYDROCHLORIDE 10 MILLIGRAM(S): 5 TABLET ORAL at 08:04

## 2018-10-31 RX ADMIN — HYDROMORPHONE HYDROCHLORIDE 0.5 MILLIGRAM(S): 2 INJECTION INTRAMUSCULAR; INTRAVENOUS; SUBCUTANEOUS at 05:05

## 2018-10-31 RX ADMIN — OXYCODONE HYDROCHLORIDE 10 MILLIGRAM(S): 5 TABLET ORAL at 13:22

## 2018-10-31 RX ADMIN — Medication 100 MILLIGRAM(S): at 17:21

## 2018-10-31 RX ADMIN — FAMOTIDINE 20 MILLIGRAM(S): 10 INJECTION INTRAVENOUS at 17:21

## 2018-10-31 RX ADMIN — OXYCODONE HYDROCHLORIDE 10 MILLIGRAM(S): 5 TABLET ORAL at 16:47

## 2018-10-31 RX ADMIN — Medication 100 MILLIGRAM(S): at 22:41

## 2018-10-31 RX ADMIN — OXYCODONE HYDROCHLORIDE 10 MILLIGRAM(S): 5 TABLET ORAL at 01:42

## 2018-10-31 RX ADMIN — OXYCODONE HYDROCHLORIDE 10 MILLIGRAM(S): 5 TABLET ORAL at 12:48

## 2018-10-31 RX ADMIN — Medication 100 MILLIGRAM(S): at 14:57

## 2018-10-31 RX ADMIN — Medication 100 MILLIGRAM(S): at 14:56

## 2018-10-31 RX ADMIN — Medication 100 MILLIGRAM(S): at 01:09

## 2018-10-31 RX ADMIN — OXYCODONE HYDROCHLORIDE 10 MILLIGRAM(S): 5 TABLET ORAL at 08:34

## 2018-10-31 RX ADMIN — ENOXAPARIN SODIUM 40 MILLIGRAM(S): 100 INJECTION SUBCUTANEOUS at 17:21

## 2018-10-31 RX ADMIN — OXYCODONE HYDROCHLORIDE 10 MILLIGRAM(S): 5 TABLET ORAL at 01:12

## 2018-10-31 RX ADMIN — OXYCODONE HYDROCHLORIDE 10 MILLIGRAM(S): 5 TABLET ORAL at 21:35

## 2018-10-31 RX ADMIN — OXYCODONE HYDROCHLORIDE 10 MILLIGRAM(S): 5 TABLET ORAL at 21:05

## 2018-10-31 RX ADMIN — HYDROMORPHONE HYDROCHLORIDE 0.5 MILLIGRAM(S): 2 INJECTION INTRAMUSCULAR; INTRAVENOUS; SUBCUTANEOUS at 04:49

## 2018-10-31 RX ADMIN — FAMOTIDINE 20 MILLIGRAM(S): 10 INJECTION INTRAVENOUS at 05:33

## 2018-10-31 NOTE — PROGRESS NOTE ADULT - SUBJECTIVE AND OBJECTIVE BOX
Patient is a 48y old  Male who presents with a chief complaint of open left ankle fracture  Patient s/p application of external fixator 10/8, I&D 10/24, I&D, revision external fixator 10/29 for I&D, LIAM, ORIF 11/1/18  Patient resting without complaints.  No chest pain, SOB, N/V.    T(C): 36.9 (10-31-18 @ 05:41), Max: 37 (10-30-18 @ 23:47)  HR: 83 (10-31-18 @ 05:41) (78 - 91)  BP: 129/83 (10-31-18 @ 05:41) (113/75 - 139/87)  RR: 18 (10-31-18 @ 05:41) (18 - 18)  SpO2: 97% (10-31-18 @ 05:41) (97% - 98%)    Exam:  Alert and Oriented, No Acute Distress  Cards: +S1/S2, RRR  Pulm: CTAB  Lower Extremities:LLE moving digits, pin sites CDI  Calves Soft, Non-tender bilaterally  +PF/DF/EHL/FHL  SILT  +DP Pulse                        13.6   8.21  )-----------( 373      ( 30 Oct 2018 08:07 )             39.8    10-30  135  |  95<L>  |  14  ----------------------------<  128<H>  3.8   |  26  |  0.91  Ca    9.5      30 Oct 2018 06:45
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
## 2018-10-31 NOTE — PROGRESS NOTE ADULT - ASSESSMENT
47 y/o M Transferred from [facility] s/p L Ex fix 10/8/18. POD#2 from I&D, revision ex-fix, and wound vac placement applied to left ankle fracture.

## 2018-10-31 NOTE — OCCUPATIONAL THERAPY INITIAL EVALUATION ADULT - ADDITIONAL COMMENTS
CT Angio 10/29-Intact left distal femoral, popliteal, anterior tibial, posterior tibial, and fibular arteries in the left lower extremity to the level of the ankle. The fibula artery at the level of the ankle as well as the vasculature of the left foot are not well evaluated.  CT left ankle 10/27- Patient status post severely comminuted pilon fracture of the distal   tibia. Oblique fracture of the distal fibula with lateral displacement of the distal fracture fragment. Nondisplaced fractures of the Stieda process of the talus, calcaneus, and navicular

## 2018-10-31 NOTE — OCCUPATIONAL THERAPY INITIAL EVALUATION ADULT - DIAGNOSIS, OT EVAL
Patient with decreased ADL status and impairments with functional mobility due to Patient with decreased ADL status and impairments with functional mobility due to decreased ROM

## 2018-10-31 NOTE — PROGRESS NOTE ADULT - SUBJECTIVE AND OBJECTIVE BOX
CC: f/u for open ankle fracture    Patient reports; ankle pain, especially when he keeps ankle in the dependant position.    REVIEW OF SYSTEMS:  All other review of systems negative (Comprehensive ROS)    Antimicrobials Day #  :POD 2  ceFAZolin   IVPB 1000 milliGRAM(s) IV Intermittent every 8 hours  gentamicin   IVPB 80 milliGRAM(s) IV Intermittent every 8 hours    Other Medications Reviewed    T(F): 98.2 (10-31-18 @ 08:00), Max: 98.6 (10-30-18 @ 23:47)  HR: 76 (10-31-18 @ 12:15)  BP: 125/83 (10-31-18 @ 12:15)  RR: 18 (10-31-18 @ 08:00)  SpO2: 99% (10-31-18 @ 12:15)  Wt(kg): --    PHYSICAL EXAM:  General: alert, no acute distress  Eyes:  anicteric, no conjunctival injection, no discharge  Oropharynx: no lesions or injection 	  Neck: supple, without adenopathy  Lungs: clear to auscultation  Heart: regular rate and rhythm; no murmur, rubs or gallops  Abdomen: soft, nondistended, nontender, without mass or organomegaly  Skin: no lesions  Extremities: no clubbing, cyanosis, or edema  Neurologic: alert, oriented, moves all extremities  Left ankle with external fixator and wound vac in place  LAB RESULTS:                        13.4   4.97  )-----------( 319      ( 31 Oct 2018 07:56 )             38.8     10-31    136  |  96  |  17  ----------------------------<  99  4.2   |  29  |  0.97    Ca    9.8      31 Oct 2018 06:19          MICROBIOLOGY:  RECENT CULTURES:  10-29 @ 16:54 .Surgical Swab fracture site left ankle 2     No growth      10-29 @ 16:53 .Surgical Swab fracture site left ankle 1     No growth          RADIOLOGY REVIEWED:  < from: CT Angio Lower Extremity w/ IV Cont, Left (10.29.18 @ 22:41) >  IMPRESSION:   Intact left distal femoral, popliteal, anterior tibial, posterior tibial,   and fibular arteries in the left lower extremity to the level of the   ankle.    The fibula artery at the level of the ankle as well as the vasculature of   the left foot are not well evaluated.    < end of copied text >

## 2018-10-31 NOTE — OCCUPATIONAL THERAPY INITIAL EVALUATION ADULT - LIVES WITH, PROFILE
spouse/Pt lives with spouse and step son in an apt. 3 steps to enter with B/L handrails. No stairs inside. +Tub with curtain. No DME/children
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
## 2018-10-31 NOTE — OCCUPATIONAL THERAPY INITIAL EVALUATION ADULT - PRECAUTIONS/LIMITATIONS, REHAB EVAL
surgical precautions/Upon removal of dressings in OR the pt was found to have small area of necrotic tissue and swollen and they decided to debride the necrotic tissue, irrigate and apply a wound vac instead of performing definitive fixation. Patient was kept on Gent and Ancef while in the hospital. He was transferred to see Dr. Diaz for surgery on monday/fall precautions

## 2018-10-31 NOTE — PROGRESS NOTE ADULT - ASSESSMENT
47 y/o M s/p application of external fixator 10/8, I&D 10/24, I&D, revision external fixator 10/29 for I&D, LIAM, ORIF 11/1/18, pre-op, med clearance  Harper Corey PA-C  Orthopaedic Surgery  Team pager [phone]  [facility] [phone]  [phone]

## 2018-10-31 NOTE — OCCUPATIONAL THERAPY INITIAL EVALUATION ADULT - GENERAL OBSERVATIONS, REHAB EVAL
Pt received semi-supine in bed, +IVL, VAC and external fixator LLE Pt received semi-supine in bed, +IVL, VAC and external fixator LLE. S/p Reconstruction L ankle w/ Flap (by Plastics) 11/1

## 2018-10-31 NOTE — PROGRESS NOTE ADULT - SUBJECTIVE AND OBJECTIVE BOX
Orthopedic Surgery Progress Note  S: Pain is well controlled.  No nausea, vomiting, chest pain, shortness of breath, lightheadedness, or dizziness. Ready for OR tomorrow for possible definitive management.    O:  Vital Signs Last 24 Hrs  T(C): 36.9 (31 Oct 2018 05:41), Max: 37 (30 Oct 2018 23:47)  T(F): 98.4 (31 Oct 2018 05:41), Max: 98.6 (30 Oct 2018 23:47)  HR: 83 (31 Oct 2018 05:41) (78 - 91)  BP: 129/83 (31 Oct 2018 05:41) (113/75 - 139/87)  RR: 18 (31 Oct 2018 05:41) (18 - 18)  SpO2: 97% (31 Oct 2018 05:41) (97% - 98%)    Gen: NAD  LLE  Ex fix in place, dressing intact. compartments soft, toes warm and mobile with brisk cap refill  wound vac in place, holding suction  Calves Soft, Non-tender bilaterally  +PF/DF/EHL/FHL  SILT  +DP Pulse                        13.6   8.21  )-----------( 373      ( 30 Oct 2018 08:07 )             39.8     10-30    135  |  95<L>  |  14  ----------------------------<  128<H>  3.8   |  26  |  0.91    A/P: 49 y/o M Transferred from [facility] s/p L Ex fix 10/8/18. POD#2 from I&D, revision ex-fix, and wound vac placement    - DVT ppx- Lovenox  - NWB LLE  - IV abx- Ancef + gent, FU ID recs, FU cultures  - Regular diet  - Pain management prn  - Plan for ORIF 11/1  - NPO at MN, IVF, AM labs for OR  - Needs medical clearance for OR    Harris Rose MD

## 2018-11-01 ENCOUNTER — APPOINTMENT (OUTPATIENT)
Dept: ORTHOPEDIC SURGERY | Facility: HOSPITAL | Age: 48
End: 2018-11-01

## 2018-11-01 ENCOUNTER — TRANSCRIPTION ENCOUNTER (OUTPATIENT)
Age: 48
End: 2018-11-01

## 2018-11-01 DIAGNOSIS — R52 PAIN, UNSPECIFIED: ICD-10-CM

## 2018-11-01 LAB
ANION GAP SERPL CALC-SCNC: 10 MMOL/L — SIGNIFICANT CHANGE UP (ref 5–17)
APTT BLD: 36.9 SEC — HIGH (ref 27.5–36.3)
BLD GP AB SCN SERPL QL: NEGATIVE — SIGNIFICANT CHANGE UP
BUN SERPL-MCNC: 14 MG/DL — SIGNIFICANT CHANGE UP (ref 7–23)
CALCIUM SERPL-MCNC: 10.2 MG/DL — SIGNIFICANT CHANGE UP (ref 8.4–10.5)
CHLORIDE SERPL-SCNC: 94 MMOL/L — LOW (ref 96–108)
CO2 SERPL-SCNC: 29 MMOL/L — SIGNIFICANT CHANGE UP (ref 22–31)
CREAT SERPL-MCNC: 0.91 MG/DL — SIGNIFICANT CHANGE UP (ref 0.5–1.3)
GLUCOSE SERPL-MCNC: 103 MG/DL — HIGH (ref 70–99)
HCT VFR BLD CALC: 38.3 % — LOW (ref 39–50)
HCT VFR BLD CALC: 41.4 % — SIGNIFICANT CHANGE UP (ref 39–50)
HGB BLD-MCNC: 13.3 G/DL — SIGNIFICANT CHANGE UP (ref 13–17)
HGB BLD-MCNC: 13.8 G/DL — SIGNIFICANT CHANGE UP (ref 13–17)
INR BLD: 1.04 RATIO — SIGNIFICANT CHANGE UP (ref 0.88–1.16)
MCHC RBC-ENTMCNC: 31.2 PG — SIGNIFICANT CHANGE UP (ref 27–34)
MCHC RBC-ENTMCNC: 32.5 PG — SIGNIFICANT CHANGE UP (ref 27–34)
MCHC RBC-ENTMCNC: 33.4 GM/DL — SIGNIFICANT CHANGE UP (ref 32–36)
MCHC RBC-ENTMCNC: 34.7 GM/DL — SIGNIFICANT CHANGE UP (ref 32–36)
MCV RBC AUTO: 93.3 FL — SIGNIFICANT CHANGE UP (ref 80–100)
MCV RBC AUTO: 93.7 FL — SIGNIFICANT CHANGE UP (ref 80–100)
PLATELET # BLD AUTO: 310 K/UL — SIGNIFICANT CHANGE UP (ref 150–400)
PLATELET # BLD AUTO: 349 K/UL — SIGNIFICANT CHANGE UP (ref 150–400)
POTASSIUM SERPL-MCNC: 4 MMOL/L — SIGNIFICANT CHANGE UP (ref 3.5–5.3)
POTASSIUM SERPL-SCNC: 4 MMOL/L — SIGNIFICANT CHANGE UP (ref 3.5–5.3)
PROTHROM AB SERPL-ACNC: 11.9 SEC — SIGNIFICANT CHANGE UP (ref 10–12.9)
RBC # BLD: 4.08 M/UL — LOW (ref 4.2–5.8)
RBC # BLD: 4.44 M/UL — SIGNIFICANT CHANGE UP (ref 4.2–5.8)
RBC # FLD: 10.8 % — SIGNIFICANT CHANGE UP (ref 10.3–14.5)
RBC # FLD: 10.9 % — SIGNIFICANT CHANGE UP (ref 10.3–14.5)
RH IG SCN BLD-IMP: POSITIVE — SIGNIFICANT CHANGE UP
SODIUM SERPL-SCNC: 133 MMOL/L — LOW (ref 135–145)
SURGICAL PATHOLOGY STUDY: SIGNIFICANT CHANGE UP
WBC # BLD: 4.7 K/UL — SIGNIFICANT CHANGE UP (ref 3.8–10.5)
WBC # BLD: 9.9 K/UL — SIGNIFICANT CHANGE UP (ref 3.8–10.5)
WBC # FLD AUTO: 4.7 K/UL — SIGNIFICANT CHANGE UP (ref 3.8–10.5)
WBC # FLD AUTO: 9.9 K/UL — SIGNIFICANT CHANGE UP (ref 3.8–10.5)

## 2018-11-01 PROCEDURE — 27828 TREAT LOWER LEG FRACTURE: CPT | Mod: 58,LT

## 2018-11-01 PROCEDURE — 27870 FUSION OF ANKLE JOINT OPEN: CPT | Mod: 58,LT

## 2018-11-01 PROCEDURE — 20694 RMVL EXT FIXJ SYS UNDER ANES: CPT | Mod: 58,LT

## 2018-11-01 RX ORDER — FAMOTIDINE 10 MG/ML
20 INJECTION INTRAVENOUS ONCE
Qty: 0 | Refills: 0 | Status: COMPLETED | OUTPATIENT
Start: 2018-11-02 | End: 2018-11-02

## 2018-11-01 RX ORDER — FAMOTIDINE 10 MG/ML
20 INJECTION INTRAVENOUS EVERY 12 HOURS
Qty: 0 | Refills: 0 | Status: DISCONTINUED | OUTPATIENT
Start: 2018-11-01 | End: 2018-11-02

## 2018-11-01 RX ORDER — OXYCODONE HYDROCHLORIDE 5 MG/1
10 TABLET ORAL EVERY 4 HOURS
Qty: 0 | Refills: 0 | Status: DISCONTINUED | OUTPATIENT
Start: 2018-11-01 | End: 2018-11-02

## 2018-11-01 RX ORDER — ACETAMINOPHEN 500 MG
1000 TABLET ORAL ONCE
Qty: 0 | Refills: 0 | Status: COMPLETED | OUTPATIENT
Start: 2018-11-01 | End: 2018-11-02

## 2018-11-01 RX ORDER — ONDANSETRON 8 MG/1
4 TABLET, FILM COATED ORAL EVERY 6 HOURS
Qty: 0 | Refills: 0 | Status: DISCONTINUED | OUTPATIENT
Start: 2018-11-01 | End: 2018-11-02

## 2018-11-01 RX ORDER — CEFAZOLIN SODIUM 1 G
1000 VIAL (EA) INJECTION EVERY 8 HOURS
Qty: 0 | Refills: 0 | Status: DISCONTINUED | OUTPATIENT
Start: 2018-11-01 | End: 2018-11-02

## 2018-11-01 RX ORDER — HYDROMORPHONE HYDROCHLORIDE 2 MG/ML
0.5 INJECTION INTRAMUSCULAR; INTRAVENOUS; SUBCUTANEOUS ONCE
Qty: 0 | Refills: 0 | Status: DISCONTINUED | OUTPATIENT
Start: 2018-11-01 | End: 2018-11-01

## 2018-11-01 RX ORDER — ONDANSETRON 8 MG/1
4 TABLET, FILM COATED ORAL EVERY 6 HOURS
Qty: 0 | Refills: 0 | Status: DISCONTINUED | OUTPATIENT
Start: 2018-11-01 | End: 2018-11-01

## 2018-11-01 RX ORDER — MAGNESIUM HYDROXIDE 400 MG/1
30 TABLET, CHEWABLE ORAL DAILY
Qty: 0 | Refills: 0 | Status: DISCONTINUED | OUTPATIENT
Start: 2018-11-01 | End: 2018-11-02

## 2018-11-01 RX ORDER — ACETAMINOPHEN 500 MG
650 TABLET ORAL EVERY 6 HOURS
Qty: 0 | Refills: 0 | Status: DISCONTINUED | OUTPATIENT
Start: 2018-11-01 | End: 2018-11-02

## 2018-11-01 RX ORDER — ENOXAPARIN SODIUM 100 MG/ML
40 INJECTION SUBCUTANEOUS DAILY
Qty: 0 | Refills: 0 | Status: DISCONTINUED | OUTPATIENT
Start: 2018-11-01 | End: 2018-11-02

## 2018-11-01 RX ORDER — HYDROMORPHONE HYDROCHLORIDE 2 MG/ML
0.5 INJECTION INTRAMUSCULAR; INTRAVENOUS; SUBCUTANEOUS EVERY 6 HOURS
Qty: 0 | Refills: 0 | Status: DISCONTINUED | OUTPATIENT
Start: 2018-11-01 | End: 2018-11-02

## 2018-11-01 RX ORDER — DOCUSATE SODIUM 100 MG
100 CAPSULE ORAL THREE TIMES A DAY
Qty: 0 | Refills: 0 | Status: DISCONTINUED | OUTPATIENT
Start: 2018-11-01 | End: 2018-11-02

## 2018-11-01 RX ORDER — SODIUM CHLORIDE 9 MG/ML
1000 INJECTION, SOLUTION INTRAVENOUS
Qty: 0 | Refills: 0 | Status: DISCONTINUED | OUTPATIENT
Start: 2018-11-01 | End: 2018-11-01

## 2018-11-01 RX ORDER — OXYCODONE HYDROCHLORIDE 5 MG/1
5 TABLET ORAL EVERY 4 HOURS
Qty: 0 | Refills: 0 | Status: DISCONTINUED | OUTPATIENT
Start: 2018-11-01 | End: 2018-11-02

## 2018-11-01 RX ORDER — FENTANYL CITRATE 50 UG/ML
25 INJECTION INTRAVENOUS
Qty: 0 | Refills: 0 | Status: DISCONTINUED | OUTPATIENT
Start: 2018-11-01 | End: 2018-11-01

## 2018-11-01 RX ADMIN — Medication 100 MILLIGRAM(S): at 05:07

## 2018-11-01 RX ADMIN — Medication 100 MILLIGRAM(S): at 22:02

## 2018-11-01 RX ADMIN — Medication 100 MILLIGRAM(S): at 22:03

## 2018-11-01 RX ADMIN — FAMOTIDINE 20 MILLIGRAM(S): 10 INJECTION INTRAVENOUS at 22:01

## 2018-11-01 RX ADMIN — SODIUM CHLORIDE 125 MILLILITER(S): 9 INJECTION, SOLUTION INTRAVENOUS at 20:30

## 2018-11-01 RX ADMIN — Medication 100 MILLIGRAM(S): at 13:11

## 2018-11-01 RX ADMIN — Medication 100 MILLIGRAM(S): at 08:38

## 2018-11-01 RX ADMIN — HYDROMORPHONE HYDROCHLORIDE 0.5 MILLIGRAM(S): 2 INJECTION INTRAMUSCULAR; INTRAVENOUS; SUBCUTANEOUS at 19:40

## 2018-11-01 RX ADMIN — Medication 100 MILLIGRAM(S): at 00:41

## 2018-11-01 RX ADMIN — OXYCODONE HYDROCHLORIDE 10 MILLIGRAM(S): 5 TABLET ORAL at 05:30

## 2018-11-01 RX ADMIN — HYDROMORPHONE HYDROCHLORIDE 0.5 MILLIGRAM(S): 2 INJECTION INTRAMUSCULAR; INTRAVENOUS; SUBCUTANEOUS at 21:05

## 2018-11-01 RX ADMIN — OXYCODONE HYDROCHLORIDE 10 MILLIGRAM(S): 5 TABLET ORAL at 13:40

## 2018-11-01 RX ADMIN — HYDROMORPHONE HYDROCHLORIDE 0.5 MILLIGRAM(S): 2 INJECTION INTRAMUSCULAR; INTRAVENOUS; SUBCUTANEOUS at 20:35

## 2018-11-01 RX ADMIN — OXYCODONE HYDROCHLORIDE 10 MILLIGRAM(S): 5 TABLET ORAL at 22:12

## 2018-11-01 RX ADMIN — HYDROMORPHONE HYDROCHLORIDE 0.5 MILLIGRAM(S): 2 INJECTION INTRAMUSCULAR; INTRAVENOUS; SUBCUTANEOUS at 19:55

## 2018-11-01 RX ADMIN — OXYCODONE HYDROCHLORIDE 10 MILLIGRAM(S): 5 TABLET ORAL at 05:04

## 2018-11-01 RX ADMIN — OXYCODONE HYDROCHLORIDE 10 MILLIGRAM(S): 5 TABLET ORAL at 13:08

## 2018-11-01 RX ADMIN — OXYCODONE HYDROCHLORIDE 10 MILLIGRAM(S): 5 TABLET ORAL at 00:41

## 2018-11-01 RX ADMIN — OXYCODONE HYDROCHLORIDE 10 MILLIGRAM(S): 5 TABLET ORAL at 01:11

## 2018-11-01 NOTE — PROGRESS NOTE ADULT - ATTENDING COMMENTS
I am a non participating [employer] physician seeing Pt in coverage for Dr Cuevas I am a non participating [facility] physician seeing Pt in coverage for Dr Cuevas.  The above patient examination was reviewed with Dr. Mesa and I agree with his evaluation, assessment and treatment plan.  Jonas Cuevas M.D.

## 2018-11-01 NOTE — PROGRESS NOTE ADULT - ASSESSMENT
49 y/o M s/p ex-fix 10/8/18, I&D 10/24, I&D, rev Ex/Fix, vac,  10/29, for LIAM, ORIF/Fusion today, NPO  Harper Corey PA-C  Orthopaedic Surgery  Team pager [phone]  [facility] [phone]  [phone]

## 2018-11-01 NOTE — PROGRESS NOTE ADULT - SUBJECTIVE AND OBJECTIVE BOX
ORTHO ATTENDING POST OP    s/p L distal tibia ORIF and ankle fusion  NWB for now. WB as per plastics after flap tomorrow  Ancef 1g until definitive closure  hold AC tonight but after plastic surgery, Lovenox 40 QD  venodynes  CBC in RR and AM  PT consult  elevation  CBC in RR and AM  VAC

## 2018-11-01 NOTE — PROGRESS NOTE ADULT - ASSESSMENT
47 y/o M Transferred from [facility] s/p L Ex fix 10/8/18. POD#2 from I&D, revision ex-fix, and wound vac placement applied to left ankle fracture. scheduled for internalization of hardware
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
## 2018-11-01 NOTE — CHART NOTE - NSCHARTNOTEFT_GEN_A_CORE
Resting without complaints.   Block still in effect  No Chest Pain, SOB, N/V.    T(C): 36.4 (11-01-18 @ 21:38), Max: 37.1 (11-01-18 @ 00:33)  HR: 101 (11-01-18 @ 21:38) (71 - 101)  BP: 131/84 (11-01-18 @ 21:38) (117/82 - 156/88)  RR: 18 (11-01-18 @ 21:38) (11 - 18)  SpO2: 97% (11-01-18 @ 21:38) (93% - 100%)  Wt(kg): --    Exam:  Alert and Mccloud, No Acute Distress  Card: +S1/S2, RRR  Pulm: CTAB  Abdomen soft / benign  Cole  [ ]   EXT        Dressing (s) C/D  [ ]     Aquacel dressing C/D [ ]        Calves soft       Decr sensory / motor 2/2 anesthesia      toes: pink warm well-perfused        2+ pulses                          13.3   9.9   )-----------( 310      ( 01 Nov 2018 19:34 )             38.3<L>     11-01    133<L>  |  94<L>  |  14  ----------------------------<  103<H>  4.0   |  29  |  0.91        A/P: S/p  LIAM -> L Ankle fusion, w/ VAC / I&D    -Cont VAC  -Ice/elevate  -NWB LLE  -Chk AM Labs  -DVT PPx: HOLD   -Pain Control PO/IV Pain Rx  - RTOR in am for Plastics Procedure / flap  -n/v checks      ***See Above  Alex GOODE  Orthopedics  B: [phone]  S: [phone]

## 2018-11-01 NOTE — BRIEF OPERATIVE NOTE - PROCEDURE
<<-----Click on this checkbox to enter Procedure Ankle fusion, left  11/01/2018    Active  [facility]

## 2018-11-01 NOTE — PRE-OP CHECKLIST - 1.
emotional support preop teaching and unit orientation given to pt and family
patient oriented to unit and procedure
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
## 2018-11-01 NOTE — PROGRESS NOTE ADULT - SUBJECTIVE AND OBJECTIVE BOX
Patient is a 48y old  Male who presents with a chief complaint of Left Pilon fracture  Pt s/p ex-fix 10/8/18, I&D 10/24, I&D, rev Ex/Fix, vac,  10/29, for LIAM, ORIF/Fusion today  Patient resting without complaints.  No chest pain, SOB, N/V.    T(C): 36.7 (11-01-18 @ 05:01), Max: 37.1 (11-01-18 @ 00:33)  HR: 82 (11-01-18 @ 05:01) (74 - 89)  BP: 124/86 (11-01-18 @ 05:01) (120/76 - 131/82)  RR: 18 (11-01-18 @ 05:01) (18 - 18)  SpO2: 99% (11-01-18 @ 05:01) (95% - 100%)    Exam:  Alert and Oriented, No Acute Distress  Cards: +S1/S2, RRR  Pulm: CTAB  Lower Extremities:LLE in Ex/Fix, pin sites CDI, moving digits  Calves Soft, Non-tender bilaterally  +PF/DF/EHL/FHL  SILT  +DP Pulse                   13.8   4.7   )-----------( 349      ( 01 Nov 2018 05:06 )             41.4    11-01  133<L>  |  94<L>  |  14  ----------------------------<  103<H>  4.0   |  29  |  0.91  Ca    10.2      01 Nov 2018 05:06

## 2018-11-01 NOTE — CHART NOTE - NSCHARTNOTEFT_GEN_A_CORE
POPLITEAL NERVE BLOCK:  Block done in SDA w/ standard ASA monitors + NC O2. Time out preformed w/ nurse. Site prepped w/ betadine, procedure preformed with sterile protocols. After localization of the nerve and relevant anatomy, ropivacaine 30cc of 0.5% was injected with spread documented on ultrasound. Injection of local was preformed with 5cc injected at a time, making sure injection pressure was adequate without resistance to injection, parasthesias, or patient discomfort. Patient tolerated procedure well, VSS.

## 2018-11-02 LAB
ANION GAP SERPL CALC-SCNC: 12 MMOL/L — SIGNIFICANT CHANGE UP (ref 5–17)
BASOPHILS # BLD AUTO: 0.01 K/UL — SIGNIFICANT CHANGE UP (ref 0–0.2)
BASOPHILS NFR BLD AUTO: 0.1 % — SIGNIFICANT CHANGE UP (ref 0–2)
BUN SERPL-MCNC: 10 MG/DL — SIGNIFICANT CHANGE UP (ref 7–23)
CALCIUM SERPL-MCNC: 9.9 MG/DL — SIGNIFICANT CHANGE UP (ref 8.4–10.5)
CHLORIDE SERPL-SCNC: 95 MMOL/L — LOW (ref 96–108)
CO2 SERPL-SCNC: 29 MMOL/L — SIGNIFICANT CHANGE UP (ref 22–31)
CREAT SERPL-MCNC: 0.92 MG/DL — SIGNIFICANT CHANGE UP (ref 0.5–1.3)
EOSINOPHIL # BLD AUTO: 0.01 K/UL — SIGNIFICANT CHANGE UP (ref 0–0.5)
EOSINOPHIL NFR BLD AUTO: 0.1 % — SIGNIFICANT CHANGE UP (ref 0–6)
GLUCOSE SERPL-MCNC: 117 MG/DL — HIGH (ref 70–99)
HCT VFR BLD CALC: 37.7 % — LOW (ref 39–50)
HGB BLD-MCNC: 13.2 G/DL — SIGNIFICANT CHANGE UP (ref 13–17)
IMM GRANULOCYTES NFR BLD AUTO: 0.2 % — SIGNIFICANT CHANGE UP (ref 0–1.5)
LYMPHOCYTES # BLD AUTO: 1.69 K/UL — SIGNIFICANT CHANGE UP (ref 1–3.3)
LYMPHOCYTES # BLD AUTO: 19.5 % — SIGNIFICANT CHANGE UP (ref 13–44)
MCHC RBC-ENTMCNC: 32.4 PG — SIGNIFICANT CHANGE UP (ref 27–34)
MCHC RBC-ENTMCNC: 35 GM/DL — SIGNIFICANT CHANGE UP (ref 32–36)
MCV RBC AUTO: 92.6 FL — SIGNIFICANT CHANGE UP (ref 80–100)
MONOCYTES # BLD AUTO: 0.75 K/UL — SIGNIFICANT CHANGE UP (ref 0–0.9)
MONOCYTES NFR BLD AUTO: 8.7 % — SIGNIFICANT CHANGE UP (ref 2–14)
NEUTROPHILS # BLD AUTO: 6.18 K/UL — SIGNIFICANT CHANGE UP (ref 1.8–7.4)
NEUTROPHILS NFR BLD AUTO: 71.4 % — SIGNIFICANT CHANGE UP (ref 43–77)
PLATELET # BLD AUTO: 303 K/UL — SIGNIFICANT CHANGE UP (ref 150–400)
POTASSIUM SERPL-MCNC: 4.4 MMOL/L — SIGNIFICANT CHANGE UP (ref 3.5–5.3)
POTASSIUM SERPL-SCNC: 4.4 MMOL/L — SIGNIFICANT CHANGE UP (ref 3.5–5.3)
RBC # BLD: 4.07 M/UL — LOW (ref 4.2–5.8)
RBC # FLD: 12 % — SIGNIFICANT CHANGE UP (ref 10.3–14.5)
SODIUM SERPL-SCNC: 136 MMOL/L — SIGNIFICANT CHANGE UP (ref 135–145)
WBC # BLD: 8.66 K/UL — SIGNIFICANT CHANGE UP (ref 3.8–10.5)
WBC # FLD AUTO: 8.66 K/UL — SIGNIFICANT CHANGE UP (ref 3.8–10.5)

## 2018-11-02 RX ORDER — ACETAMINOPHEN 500 MG
975 TABLET ORAL EVERY 8 HOURS
Qty: 0 | Refills: 0 | Status: DISCONTINUED | OUTPATIENT
Start: 2018-11-02 | End: 2018-11-03

## 2018-11-02 RX ORDER — OXYCODONE HYDROCHLORIDE 5 MG/1
5 TABLET ORAL EVERY 4 HOURS
Qty: 0 | Refills: 0 | Status: DISCONTINUED | OUTPATIENT
Start: 2018-11-02 | End: 2018-11-03

## 2018-11-02 RX ORDER — OXYCODONE HYDROCHLORIDE 5 MG/1
10 TABLET ORAL EVERY 4 HOURS
Qty: 0 | Refills: 0 | Status: DISCONTINUED | OUTPATIENT
Start: 2018-11-02 | End: 2018-11-03

## 2018-11-02 RX ORDER — ENOXAPARIN SODIUM 100 MG/ML
40 INJECTION SUBCUTANEOUS DAILY
Qty: 0 | Refills: 0 | Status: DISCONTINUED | OUTPATIENT
Start: 2018-11-03 | End: 2018-11-07

## 2018-11-02 RX ORDER — SENNA PLUS 8.6 MG/1
2 TABLET ORAL AT BEDTIME
Qty: 0 | Refills: 0 | Status: DISCONTINUED | OUTPATIENT
Start: 2018-11-02 | End: 2018-11-07

## 2018-11-02 RX ORDER — HYDROMORPHONE HYDROCHLORIDE 2 MG/ML
0.5 INJECTION INTRAMUSCULAR; INTRAVENOUS; SUBCUTANEOUS EVERY 4 HOURS
Qty: 0 | Refills: 0 | Status: DISCONTINUED | OUTPATIENT
Start: 2018-11-02 | End: 2018-11-03

## 2018-11-02 RX ORDER — SODIUM CHLORIDE 9 MG/ML
1000 INJECTION INTRAMUSCULAR; INTRAVENOUS; SUBCUTANEOUS
Qty: 0 | Refills: 0 | Status: DISCONTINUED | OUTPATIENT
Start: 2018-11-02 | End: 2018-11-02

## 2018-11-02 RX ORDER — HYDROMORPHONE HYDROCHLORIDE 2 MG/ML
0.25 INJECTION INTRAMUSCULAR; INTRAVENOUS; SUBCUTANEOUS
Qty: 0 | Refills: 0 | Status: DISCONTINUED | OUTPATIENT
Start: 2018-11-02 | End: 2018-11-02

## 2018-11-02 RX ORDER — CEFAZOLIN SODIUM 1 G
2000 VIAL (EA) INJECTION EVERY 8 HOURS
Qty: 0 | Refills: 0 | Status: DISCONTINUED | OUTPATIENT
Start: 2018-11-02 | End: 2018-11-02

## 2018-11-02 RX ORDER — SODIUM CHLORIDE 9 MG/ML
1000 INJECTION, SOLUTION INTRAVENOUS
Qty: 0 | Refills: 0 | Status: DISCONTINUED | OUTPATIENT
Start: 2018-11-02 | End: 2018-11-07

## 2018-11-02 RX ORDER — HYDROMORPHONE HYDROCHLORIDE 2 MG/ML
0.5 INJECTION INTRAMUSCULAR; INTRAVENOUS; SUBCUTANEOUS
Qty: 0 | Refills: 0 | Status: DISCONTINUED | OUTPATIENT
Start: 2018-11-02 | End: 2018-11-02

## 2018-11-02 RX ORDER — CEFAZOLIN SODIUM 1 G
2000 VIAL (EA) INJECTION EVERY 8 HOURS
Qty: 0 | Refills: 0 | Status: DISCONTINUED | OUTPATIENT
Start: 2018-11-02 | End: 2018-11-04

## 2018-11-02 RX ORDER — DOCUSATE SODIUM 100 MG
100 CAPSULE ORAL THREE TIMES A DAY
Qty: 0 | Refills: 0 | Status: DISCONTINUED | OUTPATIENT
Start: 2018-11-02 | End: 2018-11-07

## 2018-11-02 RX ORDER — ONDANSETRON 8 MG/1
4 TABLET, FILM COATED ORAL ONCE
Qty: 0 | Refills: 0 | Status: DISCONTINUED | OUTPATIENT
Start: 2018-11-02 | End: 2018-11-02

## 2018-11-02 RX ADMIN — HYDROMORPHONE HYDROCHLORIDE 0.5 MILLIGRAM(S): 2 INJECTION INTRAMUSCULAR; INTRAVENOUS; SUBCUTANEOUS at 17:00

## 2018-11-02 RX ADMIN — OXYCODONE HYDROCHLORIDE 10 MILLIGRAM(S): 5 TABLET ORAL at 13:27

## 2018-11-02 RX ADMIN — Medication 400 MILLIGRAM(S): at 06:08

## 2018-11-02 RX ADMIN — Medication 975 MILLIGRAM(S): at 21:30

## 2018-11-02 RX ADMIN — OXYCODONE HYDROCHLORIDE 10 MILLIGRAM(S): 5 TABLET ORAL at 23:00

## 2018-11-02 RX ADMIN — OXYCODONE HYDROCHLORIDE 10 MILLIGRAM(S): 5 TABLET ORAL at 18:28

## 2018-11-02 RX ADMIN — HYDROMORPHONE HYDROCHLORIDE 0.5 MILLIGRAM(S): 2 INJECTION INTRAMUSCULAR; INTRAVENOUS; SUBCUTANEOUS at 16:42

## 2018-11-02 RX ADMIN — Medication 100 MILLIGRAM(S): at 13:27

## 2018-11-02 RX ADMIN — OXYCODONE HYDROCHLORIDE 10 MILLIGRAM(S): 5 TABLET ORAL at 03:33

## 2018-11-02 RX ADMIN — Medication 100 MILLIGRAM(S): at 21:30

## 2018-11-02 RX ADMIN — Medication 100 MILLIGRAM(S): at 06:05

## 2018-11-02 RX ADMIN — FAMOTIDINE 20 MILLIGRAM(S): 10 INJECTION INTRAVENOUS at 06:05

## 2018-11-02 RX ADMIN — Medication 975 MILLIGRAM(S): at 13:34

## 2018-11-02 RX ADMIN — Medication 975 MILLIGRAM(S): at 22:00

## 2018-11-02 RX ADMIN — OXYCODONE HYDROCHLORIDE 10 MILLIGRAM(S): 5 TABLET ORAL at 19:00

## 2018-11-02 RX ADMIN — OXYCODONE HYDROCHLORIDE 10 MILLIGRAM(S): 5 TABLET ORAL at 22:29

## 2018-11-02 RX ADMIN — Medication 100 MILLIGRAM(S): at 21:34

## 2018-11-02 RX ADMIN — OXYCODONE HYDROCHLORIDE 10 MILLIGRAM(S): 5 TABLET ORAL at 14:10

## 2018-11-02 RX ADMIN — Medication 975 MILLIGRAM(S): at 14:05

## 2018-11-02 NOTE — PROGRESS NOTE ADULT - ASSESSMENT
48M s/p L distal tibia ORIF and ankle fusion 11/1    - NWB for now. WB as per plastics after flap 11/2  - Ancef 1g until definitive closure  - hold AC  but after plastic surgery, Lovenox 40 QD  - venodynes  - PT/OT/OOB  - FU plastics after OR

## 2018-11-02 NOTE — PROGRESS NOTE ADULT - ASSESSMENT
[place] fracture [place] ([facility]) on 10/8  S/P I and D  with Ext Fixation on 10/9  S/P readmission 10/24 for revision and wound debridement.He had attempt at ORIF, aborted as there was necrotic soft tissue, no purulence and OR cultures were sent and negative.  He had wound vac applied and sent to [facility].The surgical impression in the operative note did not describe infection.  S/P Operative debridement, revision of fixator 10/29, and wound vac placement  He is on day 8 cefazolin, has also  received gentamicin.  Will limit gentamicin to a short 3-5 day period ,post op.  Case reviewed yesterday with Dr Diaz  No support for infection.Will consider stopping antibiotics after ORIF in early post op period.  He will have received adequate course for open fracture.  He is s/o ORIF and I believe PRS closure  I would anticipate d/c of antibiotics in the next 24 hours if wound is closed.

## 2018-11-02 NOTE — PROGRESS NOTE ADULT - ASSESSMENT
47 y/o M Transferred from [facility] s/p L Ex fix 10/8/18. POD#2 from I&D, revision ex-fix, and wound vac placement applied to left ankle fracture.     He underwent internalization of hardware and plastics closure of wound 11/02/18

## 2018-11-02 NOTE — PROGRESS NOTE ADULT - SUBJECTIVE AND OBJECTIVE BOX
Orthopedic Surgery Progress Note     S: Patient seen and examined today. No acute events overnight. Pain is well controlled. Denies f/c, chest pain, shortness of breath, dizziness. Plan for OR today with PRS    MEDICATIONS  (STANDING):  ceFAZolin   IVPB 1000 milliGRAM(s) IV Intermittent every 8 hours  docusate sodium 100 milliGRAM(s) Oral three times a day  enoxaparin Injectable 40 milliGRAM(s) SubCutaneous daily  famotidine    Tablet 20 milliGRAM(s) Oral every 12 hours  famotidine Injectable 20 milliGRAM(s) IV Push once  influenza   Vaccine 0.5 milliLiter(s) IntraMuscular once  sodium chloride 0.9%. 1000 milliLiter(s) (100 mL/Hr) IV Continuous <Continuous>    MEDICATIONS  (PRN):  acetaminophen   Tablet .. 650 milliGRAM(s) Oral every 6 hours PRN Temp greater or equal to 38C (100.4F), Mild Pain (1 - 3)  acetaminophen  IVPB .. 1000 milliGRAM(s) IV Intermittent once PRN Severe Pain (7 - 10)  HYDROmorphone  Injectable 0.5 milliGRAM(s) IV Push every 6 hours PRN breakthrough pain  magnesium hydroxide Suspension 30 milliLiter(s) Oral daily PRN Constipation  ondansetron Injectable 4 milliGRAM(s) IV Push every 6 hours PRN Nausea and/or Vomiting  oxyCODONE    IR 10 milliGRAM(s) Oral every 4 hours PRN Severe Pain (7 - 10)  oxyCODONE    IR 5 milliGRAM(s) Oral every 4 hours PRN Moderate Pain (4 - 6)      Physical Exam:    Vital Signs Last 24 Hrs  T(C): 36.9 (02 Nov 2018 04:31), Max: 36.9 (02 Nov 2018 04:31)  T(F): 98.4 (02 Nov 2018 04:31), Max: 98.4 (02 Nov 2018 04:31)  HR: 84 (02 Nov 2018 04:31) (71 - 101)  BP: 121/75 (02 Nov 2018 04:31) (117/82 - 156/88)  BP(mean): 107 (01 Nov 2018 21:00) (107 - 113)  RR: 16 (02 Nov 2018 04:31) (11 - 18)  SpO2: 97% (02 Nov 2018 04:31) (93% - 100%)    10-31-18 @ 07:01  -  11-01-18 @ 07:00  --------------------------------------------------------  IN: 1250 mL / OUT: 1950 mL / NET: -700 mL    11-01-18 @ 07:01  -  11-02-18 @ 06:05  --------------------------------------------------------  IN: 375 mL / OUT: 2500 mL / NET: -2125 mL        Gen: NAD  Resp: no increase work of breathing  LLE: dressing c/d/i  wound vac functioning  0/5 EHL/FHL likely 2/2 nerve block  sensation slightly decreased L5-S1          LABS:                        13.3   9.9   )-----------( 310      ( 01 Nov 2018 19:34 )             38.3     11-01    133<L>  |  94<L>  |  14  ----------------------------<  103<H>  4.0   |  29  |  0.91    Ca    10.2      01 Nov 2018 05:06

## 2018-11-02 NOTE — PROGRESS NOTE ADULT - SUBJECTIVE AND OBJECTIVE BOX
CC: f/u for left ankle wound    Patient reports: he is in RR, s/p ORIF and ? wound closure    REVIEW OF SYSTEMS:  All other review of systems negative (Comprehensive ROS)    Antimicrobials Day #  :  ceFAZolin   IVPB 2000 milliGRAM(s) IV Intermittent every 8 hours    Other Medications Reviewed    T(F): 97.5 (11-02-18 @ 12:00), Max: 98.4 (11-02-18 @ 04:31)  HR: 77 (11-02-18 @ 12:30)  BP: 130/70 (11-02-18 @ 12:30)  RR: 15 (11-02-18 @ 12:30)  SpO2: 99% (11-02-18 @ 12:30)  Wt(kg): --    PHYSICAL EXAM:  General: alert, no acute distress  Eyes:  anicteric, no conjunctival injection, no discharge  Oropharynx: no lesions or injection 	  Neck: supple, without adenopathy  Lungs: clear to auscultation  Heart: regular rate and rhythm; no murmur, rubs or gallops  Abdomen: soft, nondistended, nontender, without mass or organomegaly  Skin: no lesions  Extremities: no clubbing, cyanosis, or edema  Neurologic: alert, oriented, moves all extremities  Left foot and calf dressed  LAB RESULTS:                        13.2   8.66  )-----------( 303      ( 02 Nov 2018 07:52 )             37.7     11-02    136  |  95<L>  |  10  ----------------------------<  117<H>  4.4   |  29  |  0.92    Ca    9.9      02 Nov 2018 06:35          MICROBIOLOGY:  RECENT CULTURES:  10-29 @ 16:54 .Surgical Swab fracture site left ankle 2     No growth      10-29 @ 16:53 .Surgical Swab fracture site left ankle 1     No growth          RADIOLOGY REVIEWED:
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
## 2018-11-02 NOTE — CONSULT NOTE ADULT - SUBJECTIVE AND OBJECTIVE BOX
HPI:   Patient is a 48y male with    REVIEW OF SYSTEMS:  All other review of systems negative (Comprehensive ROS)    PAST MEDICAL & SURGICAL HISTORY:  No pertinent past medical history  No significant past surgical history      Allergies    No Known Allergies    Intolerances        Antimicrobials Day #  :  ceFAZolin   IVPB 2000 milliGRAM(s) IV Intermittent every 8 hours    Other Medications:  acetaminophen   Tablet .. 975 milliGRAM(s) Oral every 8 hours  docusate sodium 100 milliGRAM(s) Oral three times a day  HYDROmorphone  Injectable 0.5 milliGRAM(s) IV Push every 4 hours PRN  influenza   Vaccine 0.5 milliLiter(s) IntraMuscular once  lactated ringers. 1000 milliLiter(s) IV Continuous <Continuous>  oxyCODONE    IR 5 milliGRAM(s) Oral every 4 hours PRN  oxyCODONE    IR 10 milliGRAM(s) Oral every 4 hours PRN  senna 2 Tablet(s) Oral at bedtime      FAMILY HISTORY:  No pertinent family history in first degree relatives      SOCIAL HISTORY:  Smoking:     ETOH:     Drug Use:     Single     T(F): 97.7 (11-02-18 @ 16:34), Max: 98.4 (11-02-18 @ 04:31)  HR: 83 (11-02-18 @ 16:34)  BP: 105/67 (11-02-18 @ 16:34)  RR: 16 (11-02-18 @ 16:34)  SpO2: 96% (11-02-18 @ 16:34)  Wt(kg): --    PHYSICAL EXAM:  General: alert, no acute distress  Eyes:  anicteric, no conjunctival injection, no discharge  Oropharynx: no lesions or injection 	  Neck: supple, without adenopathy  Lungs: clear to auscultation  Heart: regular rate and rhythm; no murmur, rubs or gallops  Abdomen: soft, nondistended, nontender, without mass or organomegaly  Skin: no lesions  Extremities: no clubbing, cyanosis, or edema  Neurologic: alert, oriented, moves all extremities    LAB RESULTS:                        13.2   8.66  )-----------( 303      ( 02 Nov 2018 07:52 )             37.7     11-02    136  |  95<L>  |  10  ----------------------------<  117<H>  4.4   |  29  |  0.92    Ca    9.9      02 Nov 2018 06:35            MICROBIOLOGY:  RECENT CULTURES:  10-29 @ 16:54 .Surgical Swab fracture site left ankle 2     No growth      10-29 @ 16:53 .Surgical Swab fracture site left ankle 1     No growth            RADIOLOGY REVIEWED:

## 2018-11-02 NOTE — CONSULT NOTE ADULT - REASON FOR ADMISSION
Transfer from [facility]
Transfer from [facility]
Transfer from [facility]

## 2018-11-03 LAB
ANION GAP SERPL CALC-SCNC: 9 MMOL/L — SIGNIFICANT CHANGE UP (ref 5–17)
BUN SERPL-MCNC: 10 MG/DL — SIGNIFICANT CHANGE UP (ref 7–23)
CALCIUM SERPL-MCNC: 9.3 MG/DL — SIGNIFICANT CHANGE UP (ref 8.4–10.5)
CHLORIDE SERPL-SCNC: 96 MMOL/L — SIGNIFICANT CHANGE UP (ref 96–108)
CO2 SERPL-SCNC: 29 MMOL/L — SIGNIFICANT CHANGE UP (ref 22–31)
CREAT SERPL-MCNC: 0.87 MG/DL — SIGNIFICANT CHANGE UP (ref 0.5–1.3)
CULTURE RESULTS: NO GROWTH — SIGNIFICANT CHANGE UP
CULTURE RESULTS: NO GROWTH — SIGNIFICANT CHANGE UP
GLUCOSE SERPL-MCNC: 113 MG/DL — HIGH (ref 70–99)
HCT VFR BLD CALC: 32.4 % — LOW (ref 39–50)
HGB BLD-MCNC: 11.1 G/DL — LOW (ref 13–17)
MCHC RBC-ENTMCNC: 32.1 PG — SIGNIFICANT CHANGE UP (ref 27–34)
MCHC RBC-ENTMCNC: 34.3 GM/DL — SIGNIFICANT CHANGE UP (ref 32–36)
MCV RBC AUTO: 93.6 FL — SIGNIFICANT CHANGE UP (ref 80–100)
PLATELET # BLD AUTO: 273 K/UL — SIGNIFICANT CHANGE UP (ref 150–400)
POTASSIUM SERPL-MCNC: 3.6 MMOL/L — SIGNIFICANT CHANGE UP (ref 3.5–5.3)
POTASSIUM SERPL-SCNC: 3.6 MMOL/L — SIGNIFICANT CHANGE UP (ref 3.5–5.3)
RBC # BLD: 3.46 M/UL — LOW (ref 4.2–5.8)
RBC # FLD: 12.4 % — SIGNIFICANT CHANGE UP (ref 10.3–14.5)
SODIUM SERPL-SCNC: 134 MMOL/L — LOW (ref 135–145)
SPECIMEN SOURCE: SIGNIFICANT CHANGE UP
SPECIMEN SOURCE: SIGNIFICANT CHANGE UP
WBC # BLD: 6.83 K/UL — SIGNIFICANT CHANGE UP (ref 3.8–10.5)
WBC # FLD AUTO: 6.83 K/UL — SIGNIFICANT CHANGE UP (ref 3.8–10.5)

## 2018-11-03 RX ORDER — HYDROMORPHONE HYDROCHLORIDE 2 MG/ML
4 INJECTION INTRAMUSCULAR; INTRAVENOUS; SUBCUTANEOUS
Qty: 0 | Refills: 0 | Status: DISCONTINUED | OUTPATIENT
Start: 2018-11-03 | End: 2018-11-07

## 2018-11-03 RX ORDER — ASCORBIC ACID 60 MG
500 TABLET,CHEWABLE ORAL DAILY
Qty: 0 | Refills: 0 | Status: DISCONTINUED | OUTPATIENT
Start: 2018-11-03 | End: 2018-11-07

## 2018-11-03 RX ORDER — ACETAMINOPHEN 500 MG
1000 TABLET ORAL ONCE
Qty: 0 | Refills: 0 | Status: COMPLETED | OUTPATIENT
Start: 2018-11-03 | End: 2018-11-04

## 2018-11-03 RX ORDER — ZOLPIDEM TARTRATE 10 MG/1
5 TABLET ORAL AT BEDTIME
Qty: 0 | Refills: 0 | Status: DISCONTINUED | OUTPATIENT
Start: 2018-11-03 | End: 2018-11-07

## 2018-11-03 RX ORDER — ACETAMINOPHEN 500 MG
1000 TABLET ORAL ONCE
Qty: 0 | Refills: 0 | Status: COMPLETED | OUTPATIENT
Start: 2018-11-03 | End: 2018-11-03

## 2018-11-03 RX ORDER — HYDROMORPHONE HYDROCHLORIDE 2 MG/ML
1 INJECTION INTRAMUSCULAR; INTRAVENOUS; SUBCUTANEOUS EVERY 4 HOURS
Qty: 0 | Refills: 0 | Status: DISCONTINUED | OUTPATIENT
Start: 2018-11-03 | End: 2018-11-07

## 2018-11-03 RX ORDER — KETOROLAC TROMETHAMINE 30 MG/ML
15 SYRINGE (ML) INJECTION ONCE
Qty: 0 | Refills: 0 | Status: DISCONTINUED | OUTPATIENT
Start: 2018-11-03 | End: 2018-11-03

## 2018-11-03 RX ORDER — ACETAMINOPHEN 500 MG
975 TABLET ORAL EVERY 8 HOURS
Qty: 0 | Refills: 0 | Status: DISCONTINUED | OUTPATIENT
Start: 2018-11-03 | End: 2018-11-07

## 2018-11-03 RX ORDER — FAMOTIDINE 10 MG/ML
20 INJECTION INTRAVENOUS
Qty: 0 | Refills: 0 | Status: DISCONTINUED | OUTPATIENT
Start: 2018-11-03 | End: 2018-11-07

## 2018-11-03 RX ORDER — POLYETHYLENE GLYCOL 3350 17 G/17G
17 POWDER, FOR SOLUTION ORAL DAILY
Qty: 0 | Refills: 0 | Status: DISCONTINUED | OUTPATIENT
Start: 2018-11-03 | End: 2018-11-07

## 2018-11-03 RX ORDER — TRAMADOL HYDROCHLORIDE 50 MG/1
50 TABLET ORAL EVERY 8 HOURS
Qty: 0 | Refills: 0 | Status: DISCONTINUED | OUTPATIENT
Start: 2018-11-03 | End: 2018-11-06

## 2018-11-03 RX ORDER — HYDROMORPHONE HYDROCHLORIDE 2 MG/ML
2 INJECTION INTRAMUSCULAR; INTRAVENOUS; SUBCUTANEOUS
Qty: 0 | Refills: 0 | Status: DISCONTINUED | OUTPATIENT
Start: 2018-11-03 | End: 2018-11-07

## 2018-11-03 RX ADMIN — FAMOTIDINE 20 MILLIGRAM(S): 10 INJECTION INTRAVENOUS at 17:12

## 2018-11-03 RX ADMIN — POLYETHYLENE GLYCOL 3350 17 GRAM(S): 17 POWDER, FOR SOLUTION ORAL at 17:16

## 2018-11-03 RX ADMIN — HYDROMORPHONE HYDROCHLORIDE 4 MILLIGRAM(S): 2 INJECTION INTRAMUSCULAR; INTRAVENOUS; SUBCUTANEOUS at 14:40

## 2018-11-03 RX ADMIN — Medication 1000 MILLIGRAM(S): at 19:20

## 2018-11-03 RX ADMIN — ENOXAPARIN SODIUM 40 MILLIGRAM(S): 100 INJECTION SUBCUTANEOUS at 11:16

## 2018-11-03 RX ADMIN — HYDROMORPHONE HYDROCHLORIDE 4 MILLIGRAM(S): 2 INJECTION INTRAMUSCULAR; INTRAVENOUS; SUBCUTANEOUS at 20:31

## 2018-11-03 RX ADMIN — HYDROMORPHONE HYDROCHLORIDE 4 MILLIGRAM(S): 2 INJECTION INTRAMUSCULAR; INTRAVENOUS; SUBCUTANEOUS at 09:00

## 2018-11-03 RX ADMIN — Medication 500 MILLIGRAM(S): at 11:16

## 2018-11-03 RX ADMIN — HYDROMORPHONE HYDROCHLORIDE 4 MILLIGRAM(S): 2 INJECTION INTRAMUSCULAR; INTRAVENOUS; SUBCUTANEOUS at 17:12

## 2018-11-03 RX ADMIN — Medication 400 MILLIGRAM(S): at 11:16

## 2018-11-03 RX ADMIN — Medication 15 MILLIGRAM(S): at 04:30

## 2018-11-03 RX ADMIN — HYDROMORPHONE HYDROCHLORIDE 4 MILLIGRAM(S): 2 INJECTION INTRAMUSCULAR; INTRAVENOUS; SUBCUTANEOUS at 21:05

## 2018-11-03 RX ADMIN — HYDROMORPHONE HYDROCHLORIDE 0.5 MILLIGRAM(S): 2 INJECTION INTRAMUSCULAR; INTRAVENOUS; SUBCUTANEOUS at 01:30

## 2018-11-03 RX ADMIN — HYDROMORPHONE HYDROCHLORIDE 0.5 MILLIGRAM(S): 2 INJECTION INTRAMUSCULAR; INTRAVENOUS; SUBCUTANEOUS at 01:08

## 2018-11-03 RX ADMIN — Medication 100 MILLIGRAM(S): at 14:05

## 2018-11-03 RX ADMIN — TRAMADOL HYDROCHLORIDE 50 MILLIGRAM(S): 50 TABLET ORAL at 23:00

## 2018-11-03 RX ADMIN — HYDROMORPHONE HYDROCHLORIDE 1 MILLIGRAM(S): 2 INJECTION INTRAMUSCULAR; INTRAVENOUS; SUBCUTANEOUS at 23:00

## 2018-11-03 RX ADMIN — Medication 100 MILLIGRAM(S): at 14:04

## 2018-11-03 RX ADMIN — OXYCODONE HYDROCHLORIDE 10 MILLIGRAM(S): 5 TABLET ORAL at 03:47

## 2018-11-03 RX ADMIN — SENNA PLUS 2 TABLET(S): 8.6 TABLET ORAL at 22:42

## 2018-11-03 RX ADMIN — HYDROMORPHONE HYDROCHLORIDE 4 MILLIGRAM(S): 2 INJECTION INTRAMUSCULAR; INTRAVENOUS; SUBCUTANEOUS at 11:48

## 2018-11-03 RX ADMIN — HYDROMORPHONE HYDROCHLORIDE 4 MILLIGRAM(S): 2 INJECTION INTRAMUSCULAR; INTRAVENOUS; SUBCUTANEOUS at 14:05

## 2018-11-03 RX ADMIN — Medication 100 MILLIGRAM(S): at 22:42

## 2018-11-03 RX ADMIN — HYDROMORPHONE HYDROCHLORIDE 4 MILLIGRAM(S): 2 INJECTION INTRAMUSCULAR; INTRAVENOUS; SUBCUTANEOUS at 17:47

## 2018-11-03 RX ADMIN — HYDROMORPHONE HYDROCHLORIDE 4 MILLIGRAM(S): 2 INJECTION INTRAMUSCULAR; INTRAVENOUS; SUBCUTANEOUS at 11:16

## 2018-11-03 RX ADMIN — Medication 15 MILLIGRAM(S): at 04:14

## 2018-11-03 RX ADMIN — Medication 100 MILLIGRAM(S): at 22:41

## 2018-11-03 RX ADMIN — Medication 1 TABLET(S): at 14:04

## 2018-11-03 RX ADMIN — HYDROMORPHONE HYDROCHLORIDE 1 MILLIGRAM(S): 2 INJECTION INTRAMUSCULAR; INTRAVENOUS; SUBCUTANEOUS at 22:41

## 2018-11-03 RX ADMIN — Medication 400 MILLIGRAM(S): at 19:04

## 2018-11-03 RX ADMIN — TRAMADOL HYDROCHLORIDE 50 MILLIGRAM(S): 50 TABLET ORAL at 14:40

## 2018-11-03 RX ADMIN — TRAMADOL HYDROCHLORIDE 50 MILLIGRAM(S): 50 TABLET ORAL at 14:04

## 2018-11-03 RX ADMIN — OXYCODONE HYDROCHLORIDE 10 MILLIGRAM(S): 5 TABLET ORAL at 03:17

## 2018-11-03 RX ADMIN — Medication 1000 MILLIGRAM(S): at 11:48

## 2018-11-03 RX ADMIN — Medication 100 MILLIGRAM(S): at 06:17

## 2018-11-03 RX ADMIN — HYDROMORPHONE HYDROCHLORIDE 4 MILLIGRAM(S): 2 INJECTION INTRAMUSCULAR; INTRAVENOUS; SUBCUTANEOUS at 08:29

## 2018-11-03 RX ADMIN — TRAMADOL HYDROCHLORIDE 50 MILLIGRAM(S): 50 TABLET ORAL at 22:41

## 2018-11-03 NOTE — PROGRESS NOTE ADULT - ATTENDING COMMENTS
I am a non participating [employer] physician seeing Pt in coverage for Dr Cuevas I am a non participating [facility] physician seeing Pt in coverage for Dr Cuevas. The above patient examination was reviewed with Dr. Mesa and I agree with his evaluation, assessment and treatment plan.  Jonas Cuevas M.D.

## 2018-11-03 NOTE — PROGRESS NOTE ADULT - SUBJECTIVE AND OBJECTIVE BOX
CC: f/u for left ankle injury    Patient reports: pain in left ankle    REVIEW OF SYSTEMS:  All other review of systems negative (Comprehensive ROS)    Antimicrobials Day #  :POD 2  ceFAZolin   IVPB 2000 milliGRAM(s) IV Intermittent every 8 hours    Other Medications Reviewed    T(F): 98.9 (11-03-18 @ 12:20), Max: 98.9 (11-03-18 @ 12:20)  HR: 80 (11-03-18 @ 12:20)  BP: 128/75 (11-03-18 @ 12:20)  RR: 18 (11-03-18 @ 12:20)  SpO2: 95% (11-03-18 @ 12:20)  Wt(kg): --    PHYSICAL EXAM:  General: alert, no acute distress  Eyes:  anicteric, no conjunctival injection, no discharge  Oropharynx: no lesions or injection 	  Neck: supple, without adenopathy  Lungs: clear to auscultation  Heart: regular rate and rhythm; no murmur, rubs or gallops  Abdomen: soft, nondistended, nontender, without mass or organomegaly  Skin: no lesions  Extremities: no clubbing, cyanosis, or edema  Neurologic: alert, oriented, moves all extremities  Left ankle dressed  LAB RESULTS:                        11.1   6.83  )-----------( 273      ( 03 Nov 2018 08:35 )             32.4     11-03    134<L>  |  96  |  10  ----------------------------<  113<H>  3.6   |  29  |  0.87    Ca    9.3      03 Nov 2018 06:51          MICROBIOLOGY:  RECENT CULTURES:  10-29 @ 16:54 .Surgical Swab fracture site left ankle 2     No growth      10-29 @ 16:53 .Surgical Swab fracture site left ankle 1     No growth          RADIOLOGY REVIEWED:
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
## 2018-11-03 NOTE — PROGRESS NOTE ADULT - SUBJECTIVE AND OBJECTIVE BOX
Post op Day [1 ]    Patient resting without complaints.  No chest pain, SOB, N/V.    T(C): 36.6 (11-03-18 @ 04:42), Max: 36.9 (11-02-18 @ 14:15)  HR: 68 (11-03-18 @ 04:42) (68 - 86)  BP: 138/85 (11-03-18 @ 04:42) (105/67 - 147/90)  RR: 16 (11-03-18 @ 04:42) (13 - 16)  SpO2: 99% (11-03-18 @ 04:42) (95% - 100%)  Wt(kg): --I&O's Summary    01 Nov 2018 07:01  -  02 Nov 2018 07:00  --------------------------------------------------------  IN: 375 mL / OUT: 2550 mL / NET: -2175 mL    02 Nov 2018 07:01  -  03 Nov 2018 06:59  --------------------------------------------------------  IN: 2220 mL / OUT: 1895 mL / NET: 325 mL    MASOOD: 40cc    Exam:  Alert and Oriented, No Acute Distress  CARDS: +S1/S2, RRR  PULM: CTAB  ABD: soft benign          EXT:   LLE           Dressing: C/D           MASOOD x 1 in place            Calves: soft            (+) swelling            able to wiggle toes          still w/ slightly decr sensation            cap refill 2-3 secs            Pulses 1+                            13.2   8.66  )-----------( 303      ( 02 Nov 2018 07:52 )             37.7    11-02    136  |  95<L>  |  10  ----------------------------<  117<H>  4.4   |  29  |  0.92    Ca    9.9      02 Nov 2018 06:35

## 2018-11-03 NOTE — PROGRESS NOTE ADULT - ASSESSMENT
A/P  Patient S/P----. Reconstruction L ankle w/ Flap (by Plastics)                          ORIF and Ankle Fusion POD #2    --Strict bedrest for now until OK w/ PRS  --cont MASOOD  --Ice / elevate  --Pain Rx adjusted  --lovenox QD  --PRS f/u      ***See Above  Alex GOODE  Orthopedics  B: [id number]  S: [phone]
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
## 2018-11-03 NOTE — ANESTHESIA FOLLOW-UP NOTE - NSEVALATION_GEN_ALL_CORE
All questions were answered/No apparent complications or complaints regarding anesthesia care at this time
No apparent complications or complaints regarding anesthesia care at this time
All questions were answered/No apparent complications or complaints regarding anesthesia care at this time

## 2018-11-04 DIAGNOSIS — K59.00 CONSTIPATION, UNSPECIFIED: ICD-10-CM

## 2018-11-04 LAB
ANION GAP SERPL CALC-SCNC: 12 MMOL/L — SIGNIFICANT CHANGE UP (ref 5–17)
BUN SERPL-MCNC: 10 MG/DL — SIGNIFICANT CHANGE UP (ref 7–23)
CALCIUM SERPL-MCNC: 10.1 MG/DL — SIGNIFICANT CHANGE UP (ref 8.4–10.5)
CHLORIDE SERPL-SCNC: 95 MMOL/L — LOW (ref 96–108)
CO2 SERPL-SCNC: 30 MMOL/L — SIGNIFICANT CHANGE UP (ref 22–31)
CREAT SERPL-MCNC: 0.85 MG/DL — SIGNIFICANT CHANGE UP (ref 0.5–1.3)
GLUCOSE SERPL-MCNC: 115 MG/DL — HIGH (ref 70–99)
POTASSIUM SERPL-MCNC: 4.4 MMOL/L — SIGNIFICANT CHANGE UP (ref 3.5–5.3)
POTASSIUM SERPL-SCNC: 4.4 MMOL/L — SIGNIFICANT CHANGE UP (ref 3.5–5.3)
SODIUM SERPL-SCNC: 137 MMOL/L — SIGNIFICANT CHANGE UP (ref 135–145)

## 2018-11-04 RX ORDER — ACETAMINOPHEN 500 MG
1000 TABLET ORAL ONCE
Qty: 0 | Refills: 0 | Status: COMPLETED | OUTPATIENT
Start: 2018-11-04 | End: 2018-11-04

## 2018-11-04 RX ORDER — MULTIVIT WITH MIN/MFOLATE/K2 340-15/3 G
300 POWDER (GRAM) ORAL ONCE
Qty: 0 | Refills: 0 | Status: COMPLETED | OUTPATIENT
Start: 2018-11-04 | End: 2018-11-04

## 2018-11-04 RX ORDER — LACTULOSE 10 G/15ML
10 SOLUTION ORAL EVERY 6 HOURS
Qty: 0 | Refills: 0 | Status: DISCONTINUED | OUTPATIENT
Start: 2018-11-04 | End: 2018-11-07

## 2018-11-04 RX ORDER — ACETAMINOPHEN 500 MG
1000 TABLET ORAL ONCE
Qty: 0 | Refills: 0 | Status: COMPLETED | OUTPATIENT
Start: 2018-11-04 | End: 2018-11-05

## 2018-11-04 RX ORDER — OXYCODONE HYDROCHLORIDE 5 MG/1
10 TABLET ORAL EVERY 12 HOURS
Qty: 0 | Refills: 0 | Status: DISCONTINUED | OUTPATIENT
Start: 2018-11-04 | End: 2018-11-07

## 2018-11-04 RX ADMIN — HYDROMORPHONE HYDROCHLORIDE 4 MILLIGRAM(S): 2 INJECTION INTRAMUSCULAR; INTRAVENOUS; SUBCUTANEOUS at 03:30

## 2018-11-04 RX ADMIN — TRAMADOL HYDROCHLORIDE 50 MILLIGRAM(S): 50 TABLET ORAL at 13:46

## 2018-11-04 RX ADMIN — ENOXAPARIN SODIUM 40 MILLIGRAM(S): 100 INJECTION SUBCUTANEOUS at 11:13

## 2018-11-04 RX ADMIN — HYDROMORPHONE HYDROCHLORIDE 4 MILLIGRAM(S): 2 INJECTION INTRAMUSCULAR; INTRAVENOUS; SUBCUTANEOUS at 07:32

## 2018-11-04 RX ADMIN — POLYETHYLENE GLYCOL 3350 17 GRAM(S): 17 POWDER, FOR SOLUTION ORAL at 13:47

## 2018-11-04 RX ADMIN — TRAMADOL HYDROCHLORIDE 50 MILLIGRAM(S): 50 TABLET ORAL at 05:45

## 2018-11-04 RX ADMIN — Medication 1000 MILLIGRAM(S): at 11:15

## 2018-11-04 RX ADMIN — Medication 1000 MILLIGRAM(S): at 03:30

## 2018-11-04 RX ADMIN — OXYCODONE HYDROCHLORIDE 10 MILLIGRAM(S): 5 TABLET ORAL at 07:29

## 2018-11-04 RX ADMIN — HYDROMORPHONE HYDROCHLORIDE 4 MILLIGRAM(S): 2 INJECTION INTRAMUSCULAR; INTRAVENOUS; SUBCUTANEOUS at 02:58

## 2018-11-04 RX ADMIN — HYDROMORPHONE HYDROCHLORIDE 4 MILLIGRAM(S): 2 INJECTION INTRAMUSCULAR; INTRAVENOUS; SUBCUTANEOUS at 01:00

## 2018-11-04 RX ADMIN — Medication 1 TABLET(S): at 11:13

## 2018-11-04 RX ADMIN — Medication 400 MILLIGRAM(S): at 10:44

## 2018-11-04 RX ADMIN — HYDROMORPHONE HYDROCHLORIDE 4 MILLIGRAM(S): 2 INJECTION INTRAMUSCULAR; INTRAVENOUS; SUBCUTANEOUS at 13:46

## 2018-11-04 RX ADMIN — TRAMADOL HYDROCHLORIDE 50 MILLIGRAM(S): 50 TABLET ORAL at 14:05

## 2018-11-04 RX ADMIN — SENNA PLUS 2 TABLET(S): 8.6 TABLET ORAL at 21:40

## 2018-11-04 RX ADMIN — Medication 500 MILLIGRAM(S): at 11:13

## 2018-11-04 RX ADMIN — Medication 100 MILLIGRAM(S): at 05:45

## 2018-11-04 RX ADMIN — FAMOTIDINE 20 MILLIGRAM(S): 10 INJECTION INTRAVENOUS at 16:50

## 2018-11-04 RX ADMIN — HYDROMORPHONE HYDROCHLORIDE 4 MILLIGRAM(S): 2 INJECTION INTRAMUSCULAR; INTRAVENOUS; SUBCUTANEOUS at 21:41

## 2018-11-04 RX ADMIN — HYDROMORPHONE HYDROCHLORIDE 1 MILLIGRAM(S): 2 INJECTION INTRAMUSCULAR; INTRAVENOUS; SUBCUTANEOUS at 16:17

## 2018-11-04 RX ADMIN — FAMOTIDINE 20 MILLIGRAM(S): 10 INJECTION INTRAVENOUS at 05:45

## 2018-11-04 RX ADMIN — HYDROMORPHONE HYDROCHLORIDE 4 MILLIGRAM(S): 2 INJECTION INTRAMUSCULAR; INTRAVENOUS; SUBCUTANEOUS at 11:15

## 2018-11-04 RX ADMIN — LACTULOSE 10 GRAM(S): 10 SOLUTION ORAL at 16:49

## 2018-11-04 RX ADMIN — Medication 100 MILLIGRAM(S): at 21:40

## 2018-11-04 RX ADMIN — OXYCODONE HYDROCHLORIDE 10 MILLIGRAM(S): 5 TABLET ORAL at 08:00

## 2018-11-04 RX ADMIN — HYDROMORPHONE HYDROCHLORIDE 1 MILLIGRAM(S): 2 INJECTION INTRAMUSCULAR; INTRAVENOUS; SUBCUTANEOUS at 16:34

## 2018-11-04 RX ADMIN — OXYCODONE HYDROCHLORIDE 10 MILLIGRAM(S): 5 TABLET ORAL at 21:41

## 2018-11-04 RX ADMIN — HYDROMORPHONE HYDROCHLORIDE 4 MILLIGRAM(S): 2 INJECTION INTRAMUSCULAR; INTRAVENOUS; SUBCUTANEOUS at 08:00

## 2018-11-04 RX ADMIN — HYDROMORPHONE HYDROCHLORIDE 4 MILLIGRAM(S): 2 INJECTION INTRAMUSCULAR; INTRAVENOUS; SUBCUTANEOUS at 22:20

## 2018-11-04 RX ADMIN — HYDROMORPHONE HYDROCHLORIDE 4 MILLIGRAM(S): 2 INJECTION INTRAMUSCULAR; INTRAVENOUS; SUBCUTANEOUS at 10:39

## 2018-11-04 RX ADMIN — HYDROMORPHONE HYDROCHLORIDE 4 MILLIGRAM(S): 2 INJECTION INTRAMUSCULAR; INTRAVENOUS; SUBCUTANEOUS at 14:05

## 2018-11-04 RX ADMIN — TRAMADOL HYDROCHLORIDE 50 MILLIGRAM(S): 50 TABLET ORAL at 06:15

## 2018-11-04 RX ADMIN — Medication 100 MILLIGRAM(S): at 13:47

## 2018-11-04 RX ADMIN — Medication 10 MILLIGRAM(S): at 18:02

## 2018-11-04 RX ADMIN — HYDROMORPHONE HYDROCHLORIDE 4 MILLIGRAM(S): 2 INJECTION INTRAMUSCULAR; INTRAVENOUS; SUBCUTANEOUS at 00:25

## 2018-11-04 RX ADMIN — Medication 300 MILLILITER(S): at 07:29

## 2018-11-04 RX ADMIN — Medication 400 MILLIGRAM(S): at 02:59

## 2018-11-04 NOTE — PROGRESS NOTE ADULT - SUBJECTIVE AND OBJECTIVE BOX
CC: f/u for left ankle fracture    Patient reports: less ankle pain today    REVIEW OF SYSTEMS:  All other review of systems negative (Comprehensive ROS)    Antimicrobials Day #  :POD 3  ceFAZolin   IVPB 2000 milliGRAM(s) IV Intermittent every 8 hours    Other Medications Reviewed    T(F): 98 (11-04-18 @ 08:04), Max: 98.9 (11-03-18 @ 12:20)  HR: 71 (11-04-18 @ 08:04)  BP: 129/78 (11-04-18 @ 08:04)  RR: 18 (11-04-18 @ 08:04)  SpO2: 95% (11-04-18 @ 08:04)  Wt(kg): --    PHYSICAL EXAM:  General: alert, no acute distress  Eyes:  anicteric, no conjunctival injection, no discharge  Oropharynx: no lesions or injection 	  Neck: supple, without adenopathy  Lungs: clear to auscultation  Heart: regular rate and rhythm; no murmur, rubs or gallops  Abdomen: soft, nondistended, nontender, without mass or organomegaly  Skin: no lesions  Extremities: no clubbing, cyanosis, or edema  Neurologic: alert, oriented, moves all extremities  Left ankle is dressed,dorsal foot incision is intact.foot with edema, limited toe movement  LAB RESULTS:                        11.1   6.83  )-----------( 273      ( 03 Nov 2018 08:35 )             32.4     11-04    137  |  95<L>  |  10  ----------------------------<  115<H>  4.4   |  30  |  0.85    Ca    10.1      04 Nov 2018 07:27          MICROBIOLOGY:  RECENT CULTURES:      RADIOLOGY REVIEWED:

## 2018-11-04 NOTE — PROGRESS NOTE ADULT - ATTENDING COMMENTS
I am a non participating [facility] physician seeing Pt in coverage for Dr Cuevas. The above patient examination was reviewed with Dr. Mesa and I agree with his evaluation, assessment and treatment plan.  Jonas Cuevas M.D.

## 2018-11-04 NOTE — PROVIDER CONTACT NOTE (OTHER) - ACTION/TREATMENT ORDERED:
See above. Telfa dsg remains in place, MD Pagan re-dressed site w. new gauze, abd pads, re-wrapped in last wrap. Pt safet maintained, LLE elevated, no ice present at this time.
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
## 2018-11-04 NOTE — PROVIDER CONTACT NOTE (OTHER) - ASSESSMENT
Pt w. bag of ice to L. ankle, ice melted and spilled onto L. ankle dressing. L. ankle dressing now saturated w. water. MD Pagan made aware. Wet last wrap, gauze and abd pads removed as per MD. Underlying telfa dsg remains w. old dried serosang drainage, remains otherwise intact. Telfa dry, remaining in place as per MD. MD Pagan came to bedside, assessed pt.
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
## 2018-11-04 NOTE — PROGRESS NOTE ADULT - ASSESSMENT
A/P  Patient S/P----. Reconstruction L ankle w/ Flap (by Plastics)                          ORIF and Ankle Fusion POD #3    -- continue Strict bedrest for now until OK w/ PRS  --cont MASOOD  --Ice / elevate  --Pain Rx adjusted  --lovenox QD  --PRS & ID notes appreciated       ***See Above  Alex GOODE  Orthopedics  B: [id number]  S: [phone]

## 2018-11-04 NOTE — PROGRESS NOTE ADULT - SUBJECTIVE AND OBJECTIVE BOX
Post op Day [2]    Patient resting sts pain better controlled  Now on dilaudid prn   No chest pain, SOB, N/V.    Vital Signs Last 24 Hrs  T(C): 36.7 (04 Nov 2018 05:35), Max: 37.2 (03 Nov 2018 12:20)  T(F): 98.1 (04 Nov 2018 05:35), Max: 98.9 (03 Nov 2018 12:20)  HR: 82 (04 Nov 2018 05:35) (70 - 82)  BP: 127/75 (04 Nov 2018 05:35) (127/75 - 144/87)  BP(mean): --  RR: 18 (04 Nov 2018 05:35) (16 - 18)  SpO2: 95% (04 Nov 2018 05:35) (95% - 97%)    MASOOD: 7cc O/N    Exam:  Alert and Oriented, No Acute Distress  CARDS: +S1/S2, RRR  PULM: CTAB  ABD: soft benign          EXT:   LLE           Dressing: C/D           MASOOD x 1 in place            Calves: soft            (+) swelling            able to wiggle toes          still w/ slightly decr sensation            cap refill 2-3 secs            Pulses 1+                            13.2   Complete Blood Count in AM (11.03.18 @ 08:35)    WBC Count: 6.83 K/uL    RBC Count: 3.46 M/uL    Hemoglobin: 11.1 g/dL    Hematocrit: 32.4 %    Platelet Count - Automated: 273 K/uL

## 2018-11-05 ENCOUNTER — TRANSCRIPTION ENCOUNTER (OUTPATIENT)
Age: 48
End: 2018-11-05

## 2018-11-05 RX ORDER — OXYCODONE HYDROCHLORIDE 5 MG/1
1 TABLET ORAL
Qty: 0 | Refills: 0 | COMMUNITY
Start: 2018-11-05

## 2018-11-05 RX ORDER — TRAMADOL HYDROCHLORIDE 50 MG/1
1 TABLET ORAL
Qty: 0 | Refills: 0 | COMMUNITY
Start: 2018-11-05

## 2018-11-05 RX ORDER — HYDROMORPHONE HYDROCHLORIDE 2 MG/ML
1 INJECTION INTRAMUSCULAR; INTRAVENOUS; SUBCUTANEOUS
Qty: 0 | Refills: 0 | COMMUNITY
Start: 2018-11-05

## 2018-11-05 RX ORDER — ACETAMINOPHEN 500 MG
3 TABLET ORAL
Qty: 0 | Refills: 0 | DISCHARGE
Start: 2018-11-05

## 2018-11-05 RX ORDER — ENOXAPARIN SODIUM 100 MG/ML
40 INJECTION SUBCUTANEOUS
Qty: 0 | Refills: 0 | COMMUNITY
Start: 2018-11-05

## 2018-11-05 RX ORDER — FAMOTIDINE 10 MG/ML
1 INJECTION INTRAVENOUS
Qty: 0 | Refills: 0 | COMMUNITY
Start: 2018-11-05

## 2018-11-05 RX ORDER — DOCUSATE SODIUM 100 MG
1 CAPSULE ORAL
Qty: 0 | Refills: 0 | COMMUNITY
Start: 2018-11-05

## 2018-11-05 RX ORDER — POLYETHYLENE GLYCOL 3350 17 G/17G
17 POWDER, FOR SOLUTION ORAL
Qty: 0 | Refills: 0 | COMMUNITY
Start: 2018-11-05

## 2018-11-05 RX ADMIN — TRAMADOL HYDROCHLORIDE 50 MILLIGRAM(S): 50 TABLET ORAL at 21:33

## 2018-11-05 RX ADMIN — TRAMADOL HYDROCHLORIDE 50 MILLIGRAM(S): 50 TABLET ORAL at 22:00

## 2018-11-05 RX ADMIN — TRAMADOL HYDROCHLORIDE 50 MILLIGRAM(S): 50 TABLET ORAL at 07:10

## 2018-11-05 RX ADMIN — OXYCODONE HYDROCHLORIDE 10 MILLIGRAM(S): 5 TABLET ORAL at 08:25

## 2018-11-05 RX ADMIN — OXYCODONE HYDROCHLORIDE 10 MILLIGRAM(S): 5 TABLET ORAL at 07:56

## 2018-11-05 RX ADMIN — HYDROMORPHONE HYDROCHLORIDE 4 MILLIGRAM(S): 2 INJECTION INTRAMUSCULAR; INTRAVENOUS; SUBCUTANEOUS at 14:52

## 2018-11-05 RX ADMIN — Medication 500 MILLIGRAM(S): at 13:21

## 2018-11-05 RX ADMIN — Medication 100 MILLIGRAM(S): at 21:33

## 2018-11-05 RX ADMIN — ENOXAPARIN SODIUM 40 MILLIGRAM(S): 100 INJECTION SUBCUTANEOUS at 13:21

## 2018-11-05 RX ADMIN — HYDROMORPHONE HYDROCHLORIDE 4 MILLIGRAM(S): 2 INJECTION INTRAMUSCULAR; INTRAVENOUS; SUBCUTANEOUS at 15:22

## 2018-11-05 RX ADMIN — OXYCODONE HYDROCHLORIDE 10 MILLIGRAM(S): 5 TABLET ORAL at 22:00

## 2018-11-05 RX ADMIN — TRAMADOL HYDROCHLORIDE 50 MILLIGRAM(S): 50 TABLET ORAL at 06:31

## 2018-11-05 RX ADMIN — Medication 100 MILLIGRAM(S): at 06:31

## 2018-11-05 RX ADMIN — HYDROMORPHONE HYDROCHLORIDE 4 MILLIGRAM(S): 2 INJECTION INTRAMUSCULAR; INTRAVENOUS; SUBCUTANEOUS at 19:40

## 2018-11-05 RX ADMIN — HYDROMORPHONE HYDROCHLORIDE 4 MILLIGRAM(S): 2 INJECTION INTRAMUSCULAR; INTRAVENOUS; SUBCUTANEOUS at 19:00

## 2018-11-05 RX ADMIN — HYDROMORPHONE HYDROCHLORIDE 4 MILLIGRAM(S): 2 INJECTION INTRAMUSCULAR; INTRAVENOUS; SUBCUTANEOUS at 09:41

## 2018-11-05 RX ADMIN — HYDROMORPHONE HYDROCHLORIDE 4 MILLIGRAM(S): 2 INJECTION INTRAMUSCULAR; INTRAVENOUS; SUBCUTANEOUS at 10:10

## 2018-11-05 RX ADMIN — TRAMADOL HYDROCHLORIDE 50 MILLIGRAM(S): 50 TABLET ORAL at 13:21

## 2018-11-05 RX ADMIN — SENNA PLUS 2 TABLET(S): 8.6 TABLET ORAL at 21:33

## 2018-11-05 RX ADMIN — Medication 400 MILLIGRAM(S): at 17:22

## 2018-11-05 RX ADMIN — Medication 100 MILLIGRAM(S): at 13:21

## 2018-11-05 RX ADMIN — Medication 1000 MILLIGRAM(S): at 17:40

## 2018-11-05 RX ADMIN — FAMOTIDINE 20 MILLIGRAM(S): 10 INJECTION INTRAVENOUS at 17:23

## 2018-11-05 RX ADMIN — FAMOTIDINE 20 MILLIGRAM(S): 10 INJECTION INTRAVENOUS at 06:31

## 2018-11-05 RX ADMIN — Medication 1 TABLET(S): at 13:21

## 2018-11-05 RX ADMIN — TRAMADOL HYDROCHLORIDE 50 MILLIGRAM(S): 50 TABLET ORAL at 13:50

## 2018-11-05 RX ADMIN — OXYCODONE HYDROCHLORIDE 10 MILLIGRAM(S): 5 TABLET ORAL at 21:33

## 2018-11-05 RX ADMIN — HYDROMORPHONE HYDROCHLORIDE 4 MILLIGRAM(S): 2 INJECTION INTRAMUSCULAR; INTRAVENOUS; SUBCUTANEOUS at 23:37

## 2018-11-05 NOTE — PROGRESS NOTE ADULT - ATTENDING COMMENTS
I am a non participating [facility] physician seeing Pt in coverage for Dr Cuevas. The above patient examination was reviewed with Dr. Mesa and I agree with his evaluation, assessment and treatment plan.  Jonas Cuevas M.D.

## 2018-11-05 NOTE — DISCHARGE NOTE ADULT - OTHER SIGNIFICANT FINDINGS
Culture - Surgical Swab (10.29.18 @ 16:54)    Specimen Source: .Surgical Swab fracture site left ankle 2    Culture Results:   No growth

## 2018-11-05 NOTE — DISCHARGE NOTE ADULT - MEDICATION SUMMARY - MEDICATIONS TO TAKE
I will START or STAY ON the medications listed below when I get home from the hospital:    3:1 Commode  -- Dx; L ankle irrigation and debridement with ORIF.   -- Indication: For ADL aid    Standard Wheelchair with Extendable Legs, elevated leg rests which are removable  -- Dx; L ankle irrigation and debridement with ORIF.   -- Indication: For ADL aid    oxyCODONE 10 mg oral tablet, extended release  -- 1 tab(s) by mouth every 12 hours MDD:2  -- Indication: For long acting pain medication    acetaminophen 325 mg oral tablet  -- 3 tab(s) by mouth every 8 hours, As needed, Mild Pain (1 - 3)  -- Indication: For mild pain    Lovenox 40 mg/0.4 mL injectable solution  -- 40 milligram(s) subcutaneously once a day   -- It is very important that you take or use this exactly as directed.  Do not skip doses or discontinue unless directed by your doctor.    -- Indication: For Anticoagulation prophylaxis    famotidine 20 mg oral tablet  -- 1 tab(s) by mouth 2 times a day  -- Indication: For GI prophylaxis    docusate sodium 100 mg oral capsule  -- 1 cap(s) by mouth 3 times a day  -- Indication: For stool softener    polyethylene glycol 3350 oral powder for reconstitution  -- 17 gram(s) by mouth once a day, As needed, Constipation  -- Indication: For laxative    senna oral tablet  -- 2 tab(s) by mouth once a day (at bedtime)  -- Indication: For stool bulking agent    Multiple Vitamins oral tablet  -- 1 tab(s) by mouth once a day  -- Indication: For supplement
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
## 2018-11-05 NOTE — DISCHARGE NOTE ADULT - CARE PLAN
Principal Discharge DX:	Ankle fracture, left, sequela  Goal:	Pain relief, improvement with activities of daily living  Assessment and plan of treatment:	Please call Dr. Diaz's office within next few days to schedule a follow up appointment about 14 days after surgery. Dressing will be changed during office visit. Out of bed, ambulate, non-weight bearing elevate when at rest- Physical therapy to assist with exercise and help increase endurance Principal Discharge DX:	Ankle fracture, left, sequela  Goal:	Pain relief, improvement with activities of daily living  Assessment and plan of treatment:	Please call Dr. Diaz's office within next few days to schedule a follow up appointment about 14 days after surgery. Dressing will be changed during office visit. Out of bed, ambulate, non-weight bearing to the left lower extremity and elevate when at rest- Physical therapy to assist with exercise and help increase endurance.

## 2018-11-05 NOTE — DIETITIAN INITIAL EVALUATION ADULT. - OTHER INFO
Patient seen for Length Of Stay on 7TOW. Reports UBW as 168lbs, current dosing weight noted as 165 lbs. Pt denies any recent changes in weight. Reports good PO intake during admission, completing 100% of meals, enjoying menu selections. Pt declined offered to obtain addition food preferences. Pt encouraged to focus on protein-rich foods, review high protein foods on menu. Denies any acute GI distress. Perviously noted with constipation, now resolved with bowel regimen. Last BM 11/4.

## 2018-11-05 NOTE — DISCHARGE NOTE ADULT - REASON FOR ADMISSION
Transfer from [facility] with Left G3 Pilon fx/ Adjusted Ex fixation, Open reduction internal fixation, debridement of skin with plastic closure

## 2018-11-05 NOTE — PROGRESS NOTE ADULT - SUBJECTIVE AND OBJECTIVE BOX
ORTHO  Patient is a 48y old  Male who presents with a chief complaint of Transfer from [facility] (04 Nov 2018 18:21)    Pt. resting without complaint    VS-  T(C): 36.9 (11-05-18 @ 04:25), Max: 36.9 (11-04-18 @ 16:04)  HR: 84 (11-05-18 @ 04:25) (71 - 105)  BP: 119/83 (11-05-18 @ 04:25) (119/83 - 148/87)  RR: 18 (11-05-18 @ 04:25) (18 - 18)  SpO2: 97% (11-05-18 @ 04:25) (95% - 98%)  Wt(kg): --    M.S. A&O  Extremity- Left LE- Splint dressing-C/D/I  Neuro-              Motor- min motion of digits 2nd to tenderness              Sensation- grossly intact to light touch                             11.1   6.83  )-----------( 273      ( 03 Nov 2018 08:35 )             32.4     11-04    137  |  95<L>  |  10  ----------------------------<  115<H>  4.4   |  30  |  0.85    Ca    10.1      04 Nov 2018 07:27
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
## 2018-11-05 NOTE — DISCHARGE NOTE ADULT - CARE PROVIDERS DIRECT ADDRESSES
[email or web address],DirectAddress_Unknown,DirectAddress_Unknown

## 2018-11-05 NOTE — DISCHARGE NOTE ADULT - CARE PROVIDER_API CALL
Cole Diaz), Orthopaedic Surgery  [address]  [place] [address]  Phone: [phone]  Fax: [phone]    Jonas Cuevas), Geriatric Medicine; Internal Medicine  [address] [place], [place] [id number]  Phone: [phone]  Fax: [phone]    Bryce Dixon), Plastic Surgery  [address] [place] [id number]  Phone: [phone]  Fax: [phone]

## 2018-11-05 NOTE — PROGRESS NOTE ADULT - SUBJECTIVE AND OBJECTIVE BOX
Pt seen and examined  AVSS NAD    LLE:  lateral mall: flap in place, well perfused.  No signs of congestion.  no erythema, no drainage from sites.  cleaned and dressing changed  yuly removed    A/P: From PRS standpoint, pt can DC home with VNS MWF- xeroform or telfa over flap. Abd and last wrap  Elevate and NWB LLE  call Surgeon's office to make appt in 1 week [phone]  Dr. Dixon Pt seen and examined  AVSS NAD    LLE:  lateral mall: flap in place, well perfused.  No signs of congestion.  no erythema, no drainage from sites.  cleaned and dressing changed  yuly removed    A/P: From PRS standpoint, pt can DC home with VNS MWF- xeroform or telfa over flap. Abd and last wrap  Elevate and NWB LLE- can be OOBTC with assist.  OOB to bathroom with crutches/NWB at home  DC on lovenox   call Surgeon's office to make appt in 1 week [phone]  Dr. Dixon

## 2018-11-05 NOTE — DISCHARGE NOTE ADULT - HOME CARE AGENCY
[facility]  for RN visit the day after discharge to assess medication administration, [facility] [place]  for RN visit the day after discharge to assess medication administration, home physical and occupational therapy services

## 2018-11-05 NOTE — DISCHARGE NOTE ADULT - ADDITIONAL INSTRUCTIONS
Recommend follow up with plastic surgery about 1 weeks, call for appointment within next few days Recommend follow up with plastic surgery Dr. Dixon, in about 1 week, call for appointment within next few days Recommend follow up with plastic surgery, Dr. Dixon, in about 1 week, call for appointment within next few days Please call Dr. Diaz's office within next few days to schedule a follow up appointment about 14 days after surgery. Dressing will be changed during office visit. Out of bed, ambulate, non-weight bearing to the left lower extremity and elevate when at rest- Physical therapy to assist with exercise and help increase endurance.  Recommend follow up with plastic surgery, Dr. Dixon, in about 1 week, call for appointment within next few days

## 2018-11-05 NOTE — PROGRESS NOTE ADULT - SUBJECTIVE AND OBJECTIVE BOX
CC: f/u for necrotic ankle post traumatic wound    Patient reports pain is better but still has it in ankle when manipulated    REVIEW OF SYSTEMS:  All other review of systems negative (Comprehensive ROS)    Antimicrobials Day #  :    Other Medications Reviewed    T(F): 98 (11-05-18 @ 15:34), Max: 98.4 (11-05-18 @ 04:25)  HR: 99 (11-05-18 @ 15:34)  BP: 122/79 (11-05-18 @ 15:34)  RR: 18 (11-05-18 @ 15:34)  SpO2: 98% (11-05-18 @ 15:34)  Wt(kg): --    PHYSICAL EXAM:  General: alert, no acute distress  Eyes:  anicteric, no conjunctival injection, no discharge  Oropharynx: no lesions or injection 	  Neck: supple, without adenopathy  Lungs: clear to auscultation  Heart: regular rate and rhythm; no murmur, rubs or gallops  Abdomen: soft, nondistended, nontender, without mass or organomegaly  Skin: no lesions  Extremities: no clubbing, . Left foot and leg dressed. no ascending cellulitis. forefoot ecchymotic but warm and good pulse.   Neurologic: alert, oriented, moves all extremities    LAB RESULTS:    11-04    137  |  95<L>  |  10  ----------------------------<  115<H>  4.4   |  30  |  0.85    Ca    10.1      04 Nov 2018 07:27          MICROBIOLOGY:  RECENT CULTURES:  Culture - Surgical Swab (10.29.18 @ 16:54)    Specimen Source: .Surgical Swab fracture site left ankle 2    Culture Results:   No growth        RADIOLOGY REVIEWED:  < from: CT Angio Lower Extremity w/ IV Cont, Left (10.29.18 @ 22:41) >    IMPRESSION:   Intact left distal femoral, popliteal, anterior tibial, posterior tibial,   and fibular arteries in the left lower extremity to the level of the   ankle.    The fibula artery at the level of the ankle as well as the vasculature of   the left foot are not well evaluated.    < end of copied text >    Assessment:  Patient with left ankle open fracture and wound necrosis s/p ext fix now s/p orif and flap closure. No infection found  Plan:  monitor off further antibiotics  local care per plastics and ortho

## 2018-11-05 NOTE — DISCHARGE NOTE ADULT - PLAN OF CARE
Pain relief, improvement with activities of daily living Please call Dr. Diaz's office within next few days to schedule a follow up appointment about 14 days after surgery. Dressing will be changed during office visit. Out of bed, ambulate, non-weight bearing elevate when at rest- Physical therapy to assist with exercise and help increase endurance Please call Dr. Diaz's office within next few days to schedule a follow up appointment about 14 days after surgery. Dressing will be changed during office visit. Out of bed, ambulate, non-weight bearing to the left lower extremity and elevate when at rest- Physical therapy to assist with exercise and help increase endurance.

## 2018-11-05 NOTE — DIETITIAN INITIAL EVALUATION ADULT. - ORAL INTAKE PTA
Pt reports good PO intake and appetite PTA, consumes 3 meals per day consisting of a variety of foods. Confirms NKFA. Take multivitamin. Pt denies chewing/swallowing difficulty, nausea, vomiting, diarrhea, constipation PTA./good

## 2018-11-05 NOTE — DISCHARGE NOTE ADULT - NS AS ACTIVITY OBS
No Heavy lifting/straining/Stairs allowed/Do not make important decisions/Walking-Indoors allowed/Walking-Outdoors allowed/keep dressing wound clean and dry til discussed with plastic surgeon/Do not drive or operate machinery

## 2018-11-05 NOTE — DISCHARGE NOTE ADULT - PATIENT PORTAL LINK FT
You can access the [facility] Patient Portal, offered by [facility], by registering with the following website: [email or web address]
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
## 2018-11-05 NOTE — PROGRESS NOTE ADULT - ASSESSMENT
Impression: Stable       Plan:   Continue present treatment                 bed rest til Okayed by plastic surgery)                  Physical therapy evaluation when cleared for OOB                  Continue to monitor    Gurmeet Rankin PA-C  Orthopaedic Surgery  Team pager [phone]  [phone]
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
## 2018-11-06 RX ORDER — SENNA PLUS 8.6 MG/1
2 TABLET ORAL
Qty: 0 | Refills: 0 | COMMUNITY
Start: 2018-11-06

## 2018-11-06 RX ADMIN — HYDROMORPHONE HYDROCHLORIDE 4 MILLIGRAM(S): 2 INJECTION INTRAMUSCULAR; INTRAVENOUS; SUBCUTANEOUS at 21:55

## 2018-11-06 RX ADMIN — HYDROMORPHONE HYDROCHLORIDE 4 MILLIGRAM(S): 2 INJECTION INTRAMUSCULAR; INTRAVENOUS; SUBCUTANEOUS at 10:05

## 2018-11-06 RX ADMIN — Medication 100 MILLIGRAM(S): at 21:25

## 2018-11-06 RX ADMIN — Medication 1 TABLET(S): at 11:32

## 2018-11-06 RX ADMIN — FAMOTIDINE 20 MILLIGRAM(S): 10 INJECTION INTRAVENOUS at 05:26

## 2018-11-06 RX ADMIN — HYDROMORPHONE HYDROCHLORIDE 4 MILLIGRAM(S): 2 INJECTION INTRAMUSCULAR; INTRAVENOUS; SUBCUTANEOUS at 21:25

## 2018-11-06 RX ADMIN — OXYCODONE HYDROCHLORIDE 10 MILLIGRAM(S): 5 TABLET ORAL at 08:25

## 2018-11-06 RX ADMIN — OXYCODONE HYDROCHLORIDE 10 MILLIGRAM(S): 5 TABLET ORAL at 20:11

## 2018-11-06 RX ADMIN — HYDROMORPHONE HYDROCHLORIDE 4 MILLIGRAM(S): 2 INJECTION INTRAMUSCULAR; INTRAVENOUS; SUBCUTANEOUS at 05:26

## 2018-11-06 RX ADMIN — SENNA PLUS 2 TABLET(S): 8.6 TABLET ORAL at 21:25

## 2018-11-06 RX ADMIN — TRAMADOL HYDROCHLORIDE 50 MILLIGRAM(S): 50 TABLET ORAL at 07:15

## 2018-11-06 RX ADMIN — OXYCODONE HYDROCHLORIDE 10 MILLIGRAM(S): 5 TABLET ORAL at 19:41

## 2018-11-06 RX ADMIN — Medication 500 MILLIGRAM(S): at 11:33

## 2018-11-06 RX ADMIN — ENOXAPARIN SODIUM 40 MILLIGRAM(S): 100 INJECTION SUBCUTANEOUS at 11:32

## 2018-11-06 RX ADMIN — HYDROMORPHONE HYDROCHLORIDE 4 MILLIGRAM(S): 2 INJECTION INTRAMUSCULAR; INTRAVENOUS; SUBCUTANEOUS at 10:35

## 2018-11-06 RX ADMIN — Medication 100 MILLIGRAM(S): at 11:33

## 2018-11-06 RX ADMIN — HYDROMORPHONE HYDROCHLORIDE 4 MILLIGRAM(S): 2 INJECTION INTRAMUSCULAR; INTRAVENOUS; SUBCUTANEOUS at 15:05

## 2018-11-06 RX ADMIN — Medication 100 MILLIGRAM(S): at 05:26

## 2018-11-06 RX ADMIN — FAMOTIDINE 20 MILLIGRAM(S): 10 INJECTION INTRAVENOUS at 18:34

## 2018-11-06 RX ADMIN — OXYCODONE HYDROCHLORIDE 10 MILLIGRAM(S): 5 TABLET ORAL at 07:51

## 2018-11-06 RX ADMIN — HYDROMORPHONE HYDROCHLORIDE 4 MILLIGRAM(S): 2 INJECTION INTRAMUSCULAR; INTRAVENOUS; SUBCUTANEOUS at 14:34

## 2018-11-06 RX ADMIN — HYDROMORPHONE HYDROCHLORIDE 4 MILLIGRAM(S): 2 INJECTION INTRAMUSCULAR; INTRAVENOUS; SUBCUTANEOUS at 06:00

## 2018-11-06 RX ADMIN — TRAMADOL HYDROCHLORIDE 50 MILLIGRAM(S): 50 TABLET ORAL at 06:42

## 2018-11-06 RX ADMIN — HYDROMORPHONE HYDROCHLORIDE 4 MILLIGRAM(S): 2 INJECTION INTRAMUSCULAR; INTRAVENOUS; SUBCUTANEOUS at 00:07
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
## 2018-11-06 NOTE — PROGRESS NOTE ADULT - SUBJECTIVE AND OBJECTIVE BOX
Orthopedic Surgery Progress Note     S: Patient seen and examined today. No acute events overnight. Pain is well controlled. Denies f/c, chest pain, shortness of breath, dizziness. Plastics pulled MASOOD drain yesterday. Work with PT yesterday who recommended home with home PT    MEDICATIONS  (STANDING):  ascorbic acid 500 milliGRAM(s) Oral daily  docusate sodium 100 milliGRAM(s) Oral three times a day  enoxaparin Injectable 40 milliGRAM(s) SubCutaneous daily  famotidine    Tablet 20 milliGRAM(s) Oral two times a day  influenza   Vaccine 0.5 milliLiter(s) IntraMuscular once  lactated ringers. 1000 milliLiter(s) (100 mL/Hr) IV Continuous <Continuous>  lactulose Syrup 10 Gram(s) Oral every 6 hours  multivitamin 1 Tablet(s) Oral daily  oxyCODONE  ER Tablet 10 milliGRAM(s) Oral every 12 hours  senna 2 Tablet(s) Oral at bedtime  traMADol 50 milliGRAM(s) Oral every 8 hours    MEDICATIONS  (PRN):  acetaminophen   Tablet .. 975 milliGRAM(s) Oral every 8 hours PRN Mild Pain (1 - 3)  bisacodyl Suppository 10 milliGRAM(s) Rectal daily PRN Constipation  HYDROmorphone   Tablet 2 milliGRAM(s) Oral every 3 hours PRN Moderate Pain (4 - 6)  HYDROmorphone   Tablet 4 milliGRAM(s) Oral every 3 hours PRN Severe Pain (7 - 10)  HYDROmorphone  Injectable 1 milliGRAM(s) IV Push every 4 hours PRN breakthrough  polyethylene glycol 3350 17 Gram(s) Oral daily PRN Constipation  zolpidem 5 milliGRAM(s) Oral at bedtime PRN Insomnia      Physical Exam:    Vital Signs Last 24 Hrs  T(C): 36.8 (06 Nov 2018 05:07), Max: 36.8 (05 Nov 2018 08:44)  T(F): 98.3 (06 Nov 2018 05:07), Max: 98.3 (05 Nov 2018 08:44)  HR: 90 (06 Nov 2018 05:07) (83 - 105)  BP: 120/82 (06 Nov 2018 05:07) (120/80 - 122/85)  BP(mean): --  RR: 18 (06 Nov 2018 05:07) (18 - 18)  SpO2: 94% (06 Nov 2018 05:07) (94% - 98%)    11-04-18 @ 07:01  -  11-05-18 @ 07:00  --------------------------------------------------------  IN: 700 mL / OUT: 2408 mL / NET: -1708 mL    11-05-18 @ 07:01  -  11-06-18 @ 06:26  --------------------------------------------------------  IN: 1080 mL / OUT: 1900 mL / NET: -820 mL        Gen: NAD  Resp: no increase work of breathing  LLE: dressing c/d/i.  wiggling toes  SILT Harvey/Sap/DP/SP  WWP          LABS:    11-04    137  |  95<L>  |  10  ----------------------------<  115<H>  4.4   |  30  |  0.85    Ca    10.1      04 Nov 2018 07:27

## 2018-11-06 NOTE — PROGRESS NOTE ADULT - ASSESSMENT
49 y/o s/o left hindfoot nail (11/1) and PRS reverse sural flap (11/2)    - PT/OT/OOB  - NWB  - strict bedrest with elevation  - pain control  - FU plastics  - plan for dispo to home with home PT

## 2018-11-06 NOTE — PROGRESS NOTE ADULT - ATTENDING COMMENTS
I am a non participating [facility] physician seeing Pt in coverage for Dr Cuevas. The above patient examination was reviewed with Dr. Mesa and I agree with his evaluation, assessment and treatment plan.  Jonas Cuevas M.D. Patient seen s/p internalization of hardware and plastic closure of wound 11/01/18. Receiving physical therapy  and being monitored off antibiotics, as per I.D.  Beginning to ambulate non weight bearing and doing well. No medical complications post-op to date and to proceed with physical therapy, as tolerated. Continues pulmonary toilet to lessen atelectasis, leg exercises as taught to lessen the risk of DVT and supervised pain medications for post-op pain control. I anticipate discharge to home to follow when cleared by orthopedics.

## 2018-11-07 VITALS
RESPIRATION RATE: 18 BRPM | OXYGEN SATURATION: 98 % | SYSTOLIC BLOOD PRESSURE: 121 MMHG | DIASTOLIC BLOOD PRESSURE: 82 MMHG | HEART RATE: 97 BPM | TEMPERATURE: 98 F

## 2018-11-07 PROCEDURE — 88304 TISSUE EXAM BY PATHOLOGIST: CPT

## 2018-11-07 PROCEDURE — 80048 BASIC METABOLIC PNL TOTAL CA: CPT

## 2018-11-07 PROCEDURE — 85730 THROMBOPLASTIN TIME PARTIAL: CPT

## 2018-11-07 PROCEDURE — 87075 CULTR BACTERIA EXCEPT BLOOD: CPT

## 2018-11-07 PROCEDURE — 86901 BLOOD TYPING SEROLOGIC RH(D): CPT

## 2018-11-07 PROCEDURE — 76377 3D RENDER W/INTRP POSTPROCES: CPT

## 2018-11-07 PROCEDURE — 85610 PROTHROMBIN TIME: CPT

## 2018-11-07 PROCEDURE — 97110 THERAPEUTIC EXERCISES: CPT

## 2018-11-07 PROCEDURE — C1713: CPT

## 2018-11-07 PROCEDURE — C1769: CPT

## 2018-11-07 PROCEDURE — 85027 COMPLETE CBC AUTOMATED: CPT

## 2018-11-07 PROCEDURE — 71045 X-RAY EXAM CHEST 1 VIEW: CPT

## 2018-11-07 PROCEDURE — 73590 X-RAY EXAM OF LOWER LEG: CPT

## 2018-11-07 PROCEDURE — 73610 X-RAY EXAM OF ANKLE: CPT

## 2018-11-07 PROCEDURE — 97166 OT EVAL MOD COMPLEX 45 MIN: CPT

## 2018-11-07 PROCEDURE — 88311 DECALCIFY TISSUE: CPT

## 2018-11-07 PROCEDURE — 73706 CT ANGIO LWR EXTR W/O&W/DYE: CPT

## 2018-11-07 PROCEDURE — 87070 CULTURE OTHR SPECIMN AEROBIC: CPT

## 2018-11-07 PROCEDURE — 97116 GAIT TRAINING THERAPY: CPT

## 2018-11-07 PROCEDURE — 97161 PT EVAL LOW COMPLEX 20 MIN: CPT

## 2018-11-07 PROCEDURE — 97530 THERAPEUTIC ACTIVITIES: CPT

## 2018-11-07 PROCEDURE — 76000 FLUOROSCOPY <1 HR PHYS/QHP: CPT

## 2018-11-07 PROCEDURE — 86850 RBC ANTIBODY SCREEN: CPT

## 2018-11-07 PROCEDURE — 93005 ELECTROCARDIOGRAM TRACING: CPT

## 2018-11-07 PROCEDURE — 73700 CT LOWER EXTREMITY W/O DYE: CPT

## 2018-11-07 PROCEDURE — C1889: CPT

## 2018-11-07 PROCEDURE — 86900 BLOOD TYPING SEROLOGIC ABO: CPT

## 2018-11-07 RX ORDER — HYDROMORPHONE HYDROCHLORIDE 2 MG/ML
1 INJECTION INTRAMUSCULAR; INTRAVENOUS; SUBCUTANEOUS
Qty: 42 | Refills: 0
Start: 2018-11-07 | End: 2018-11-13

## 2018-11-07 RX ORDER — ENOXAPARIN SODIUM 100 MG/ML
40 INJECTION SUBCUTANEOUS
Qty: 36 | Refills: 0
Start: 2018-11-07 | End: 2018-12-12

## 2018-11-07 RX ORDER — OXYCODONE HYDROCHLORIDE 5 MG/1
1 TABLET ORAL
Qty: 14 | Refills: 0
Start: 2018-11-07 | End: 2018-11-13

## 2018-11-07 RX ADMIN — Medication 1 TABLET(S): at 11:34

## 2018-11-07 RX ADMIN — FAMOTIDINE 20 MILLIGRAM(S): 10 INJECTION INTRAVENOUS at 06:07

## 2018-11-07 RX ADMIN — Medication 500 MILLIGRAM(S): at 11:34

## 2018-11-07 RX ADMIN — Medication 975 MILLIGRAM(S): at 11:35

## 2018-11-07 RX ADMIN — HYDROMORPHONE HYDROCHLORIDE 4 MILLIGRAM(S): 2 INJECTION INTRAMUSCULAR; INTRAVENOUS; SUBCUTANEOUS at 06:36

## 2018-11-07 RX ADMIN — ENOXAPARIN SODIUM 40 MILLIGRAM(S): 100 INJECTION SUBCUTANEOUS at 11:34

## 2018-11-07 RX ADMIN — OXYCODONE HYDROCHLORIDE 10 MILLIGRAM(S): 5 TABLET ORAL at 08:51

## 2018-11-07 RX ADMIN — HYDROMORPHONE HYDROCHLORIDE 4 MILLIGRAM(S): 2 INJECTION INTRAMUSCULAR; INTRAVENOUS; SUBCUTANEOUS at 06:06

## 2018-11-07 RX ADMIN — HYDROMORPHONE HYDROCHLORIDE 4 MILLIGRAM(S): 2 INJECTION INTRAMUSCULAR; INTRAVENOUS; SUBCUTANEOUS at 15:26

## 2018-11-07 RX ADMIN — HYDROMORPHONE HYDROCHLORIDE 4 MILLIGRAM(S): 2 INJECTION INTRAMUSCULAR; INTRAVENOUS; SUBCUTANEOUS at 09:17

## 2018-11-07 RX ADMIN — Medication 100 MILLIGRAM(S): at 06:08

## 2018-11-07 RX ADMIN — HYDROMORPHONE HYDROCHLORIDE 4 MILLIGRAM(S): 2 INJECTION INTRAMUSCULAR; INTRAVENOUS; SUBCUTANEOUS at 16:04

## 2018-11-07 RX ADMIN — FAMOTIDINE 20 MILLIGRAM(S): 10 INJECTION INTRAVENOUS at 17:04

## 2018-11-07 RX ADMIN — Medication 975 MILLIGRAM(S): at 12:10

## 2018-11-07 RX ADMIN — OXYCODONE HYDROCHLORIDE 10 MILLIGRAM(S): 5 TABLET ORAL at 09:22

## 2018-11-07 RX ADMIN — HYDROMORPHONE HYDROCHLORIDE 4 MILLIGRAM(S): 2 INJECTION INTRAMUSCULAR; INTRAVENOUS; SUBCUTANEOUS at 09:53

## 2018-11-07 NOTE — PROGRESS NOTE ADULT - ATTENDING COMMENTS
Patient discharged home  continue current meds  PO as tolerated  activity as per ortho  Patient aware of plan  I am a non participating [facility] physician seeing Pt in coverage for Dr Cuevas Patient discharged home  continue current meds  PO as tolerated  activity as per ortho  Patient aware of plan  I am a non participating [employer] physician seeing Pt in coverage for Dr Cuevas. The above patient examination was reviewed with Dr. Mesa and I agree with his evaluation, assessment and treatment plan.  Jonas Cuevas M.D.

## 2018-11-07 NOTE — PROGRESS NOTE ADULT - SUBJECTIVE AND OBJECTIVE BOX
CC: f/u for necrotic L ankle post traumatic wound    Patient reports pain controlled, d/c planning     REVIEW OF SYSTEMS:  All other review of systems negative (Comprehensive ROS)    Antimicrobials off  Medications Reviewed    Vital Signs Last 24 Hrs  T(F): 98 (07 Nov 2018 13:08), Max: 98.3 (07 Nov 2018 09:08)  HR: 97 (07 Nov 2018 13:08) (92 - 99)  BP: 121/82 (07 Nov 2018 13:08) (114/81 - 133/90)  BP(mean): --  RR: 18 (07 Nov 2018 13:08) (18 - 18)  SpO2: 98% (07 Nov 2018 13:08) (94% - 100%)    PHYSICAL EXAM:  General: alert, no acute distress  Eyes:  anicteric, no conjunctival injection, no discharge  Oropharynx: no lesions or injection 	  Neck: supple, without adenopathy  Lungs: clear to auscultation  Heart: regular rate and rhythm; no murmur, rubs or gallops  Abdomen: soft, nondistended, nontender, without mass or organomegaly  Skin: no lesions  Extremities: Left ankle/foot examined with PA- lat flap clean and intact; incisions all dry; no cellulitis    Neurologic: alert, oriented, moves all extremities    LAB RESULTS:  No new data    MICROBIOLOGY:  RECENT CULTURES:  Culture - Surgical Swab (10.29.18 @ 16:54)    Specimen Source: .Surgical Swab fracture site left ankle 2    Culture Results:   No growth    Culture - Surgical Swab (10.29.18 @ 16:53)    Specimen Source: .Surgical Swab fracture site left ankle 1    Culture Results:   No growth    RADIOLOGY REVIEWED:  CT Angio Lower Extremity w/ IV Cont, Left (10.29.18 @ 22:41) >  Intact left distal femoral, popliteal, anterior tibial, posterior tibial,   and fibular arteries in the left lower extremity to the level of the   ankle.    The fibula artery at the level of the ankle as well as the vasculature of   the left foot are not well evaluated.

## 2018-11-07 NOTE — PROGRESS NOTE ADULT - SUBJECTIVE AND OBJECTIVE BOX
Orthopedic Surgery Progress Note     S: Patient seen and examined today. No acute events overnight. Pain is well controlled. Denies f/c, chest pain, shortness of breath, dizziness.    MEDICATIONS  (STANDING):  ascorbic acid 500 milliGRAM(s) Oral daily  docusate sodium 100 milliGRAM(s) Oral three times a day  enoxaparin Injectable 40 milliGRAM(s) SubCutaneous daily  famotidine    Tablet 20 milliGRAM(s) Oral two times a day  influenza   Vaccine 0.5 milliLiter(s) IntraMuscular once  lactated ringers. 1000 milliLiter(s) (100 mL/Hr) IV Continuous <Continuous>  lactulose Syrup 10 Gram(s) Oral every 6 hours  multivitamin 1 Tablet(s) Oral daily  oxyCODONE  ER Tablet 10 milliGRAM(s) Oral every 12 hours  senna 2 Tablet(s) Oral at bedtime    MEDICATIONS  (PRN):  acetaminophen   Tablet .. 975 milliGRAM(s) Oral every 8 hours PRN Mild Pain (1 - 3)  bisacodyl Suppository 10 milliGRAM(s) Rectal daily PRN Constipation  HYDROmorphone   Tablet 2 milliGRAM(s) Oral every 3 hours PRN Moderate Pain (4 - 6)  HYDROmorphone   Tablet 4 milliGRAM(s) Oral every 3 hours PRN Severe Pain (7 - 10)  HYDROmorphone  Injectable 1 milliGRAM(s) IV Push every 4 hours PRN breakthrough  polyethylene glycol 3350 17 Gram(s) Oral daily PRN Constipation  zolpidem 5 milliGRAM(s) Oral at bedtime PRN Insomnia      Physical Exam:      Vital Signs Last 24 Hrs  T(C): 36.8 (07 Nov 2018 04:41), Max: 36.8 (06 Nov 2018 12:15)  T(F): 98.2 (07 Nov 2018 04:41), Max: 98.2 (06 Nov 2018 12:15)  HR: 94 (07 Nov 2018 04:41) (78 - 99)  BP: 114/81 (07 Nov 2018 04:41) (114/81 - 134/86)  BP(mean): --  RR: 18 (07 Nov 2018 04:41) (18 - 18)  SpO2: 98% (07 Nov 2018 04:41) (94% - 100%)    11-05-18 @ 07:01  -  11-06-18 @ 07:00  --------------------------------------------------------  IN: 1080 mL / OUT: 1900 mL / NET: -820 mL    11-06-18 @ 07:01  -  11-07-18 @ 05:59  --------------------------------------------------------  IN: 1360 mL / OUT: 1125 mL / NET: 235 mL        Gen: NAD  Resp: no increase work of breathing  LLE: dressing c/d/i.  wiggling toes  HERMANT Harvey/Sap/DP/SP  WWP        LABS:
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
## 2018-11-07 NOTE — PROGRESS NOTE ADULT - ASSESSMENT
Patient with left ankle open fracture and wound necrosis  Prior ext fix   S/p ORIF and flap closure- no infection found  Cxs finalized as negative  Wounds clean, lat flap intact    Plan:  monitor off antibiotics  local care per plastics and ortho  D/w PA

## 2018-11-07 NOTE — PROGRESS NOTE ADULT - ASSESSMENT
47 y/o M Transferred from [facility] s/p L Ex fix 10/8/18. POD#2 from I&D, revision ex-fix, and wound vac placement applied to left ankle fracture.  Patient seen s/p internalization of hardware and plastic closure of wound 11/01/18. Receiving physical therapy  and being monitored off antibiotics, as per I.D.

## 2018-11-07 NOTE — PROGRESS NOTE ADULT - ASSESSMENT
49 y/o s/o left hindfoot nail (11/1) and PRS reverse sural flap (11/2)    - PT/OT/OOB  - NWB  - strict bedrest with elevation  - pain control  - FU plastics  - Lovenox for DVT ppx  - plan for dispo to home with home PT

## 2018-11-08 NOTE — PROGRESS NOTE ADULT - PROBLEM SELECTOR PLAN 4
continue lactulose for constipation
resolving with suppository

## 2018-11-08 NOTE — PROGRESS NOTE ADULT - PROBLEM SELECTOR PLAN 1
General: alert, no acute distress  Eyes:  anicteric, no conjunctival injection, no discharge  Oropharynx: no lesions or injection 	  Neck: supple, without adenopathy  Lungs: clear to auscultation  Heart: regular rate and rhythm; no murmur, rubs or gallops  Abdomen: soft, nondistended, nontender, without mass or organomegaly  Skin: no lesions  Extremities: no clubbing, cyanosis, or edema  Neurologic: alert, oriented, moves all extremities  Left ankle with external fixator and wound vac in place  Medically stable to proceed to surgery as planned
Left ankle with external fixator and wound vac in place  Medically stable to proceed to surgery as planned  awaiting OR  plastics to close wound after ORIF
Left ankle with external fixator and wound vac in place  Medically stable to proceed to surgery as planned  awaiting OR  plastics to close wound after ORIF
tolerated procedures well.  pain meds as needed  Patient to remain on bedrest as per plastics
tolerated procedures well.  pain meds as needed  Patient to remain on bedrest as per plastics
tolerated procedures well.  pain meds as needed  Patient to start working with physical therapy  Keep leg elevated as much as tolerated

## 2018-11-08 NOTE — PROGRESS NOTE ADULT - NSHPATTENDINGPLANDISCUSS_GEN_ALL_CORE
patient
Patient and family
Patient and family
the patient and the orthopedic resident.
patient and staff
patient and staff
patient and wife
patient and wife
patient
patient and wife

## 2018-11-08 NOTE — PROGRESS NOTE ADULT - REASON FOR ADMISSION
Transfer from [facility]
Transfer from [facility]
Transfer from [facility]
Transfer from [facility]
Transfer from [facility]
Transfer from [facility]
Transfer from [facility]
LONA Arriola Fx
LONA Arriola Fx
Left Pilon fracture
Left open ankle fracture
Transfer from [facility]
Transfer from [facility]
Transfer from [facility]
Transfer from [facility]
Transfer from [facility]
Transfer from [facility]
Transfer from [facility]
Transfer from [facility]
Transfer from [facility]
Transfer from [facility]
Transfer from [facility]
Transfer from [facility]
left open ankle fracture, orif
Transfer from [facility]
Transfer from [facility]
Transfer from [facility]
Transfer from [facility]
Transfer from [facility]
Transfer from [facility]
Transfer from [facility]
Left ankle fracture
Transfer from [facility]
Transfer from [facility]
Transfer from [facility]

## 2018-11-08 NOTE — PROGRESS NOTE ADULT - PROBLEM SELECTOR PLAN 2
Currently on Cefazolin and Gentamycin
abx DCed by ID   continue to follow off abx
continue cefazolin   length of abx course as per ID
continue cefazolin   length of abx course as per ID
on cefazolin  abx as per ID
on cefazolin  abx as per ID
abx DCed by ID   continue to follow off abx
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
## 2018-11-08 NOTE — PROGRESS NOTE ADULT - ATTENDING COMMENTS
Patient discharged home  continue current meds  PO as tolerated  activity as per ortho  Patient aware of plan  I am a non participating [facility] physician seeing Pt in coverage for Dr Cuevas

## 2018-11-08 NOTE — PROGRESS NOTE ADULT - PROVIDER SPECIALTY LIST ADULT
Infectious Disease
Internal Medicine
Orthopedics
Plastic Surgery
Internal Medicine

## 2018-11-08 NOTE — PROGRESS NOTE ADULT - PROBLEM SELECTOR PLAN 3
pain meds as needed   follow for oversedation  GI regimen for constipation

## 2018-11-08 NOTE — PROGRESS NOTE ADULT - PROBLEM SELECTOR PROBLEM 1
Ankle fracture, left, sequela

## 2018-11-08 NOTE — PROGRESS NOTE ADULT - ASSESSMENT
47 y/o M Transferred from [facility] s/p L Ex fix 10/8/18. POD#2 from I&D, revision ex-fix, and wound vac placement applied to left ankle fracture.  Patient seen s/p internalization of hardware and plastic closure of wound 11/01/18. Receiving physical therapy  and being monitored off antibiotics, as per I.D.

## 2018-11-14 ENCOUNTER — APPOINTMENT (OUTPATIENT)
Dept: ORTHOPEDIC SURGERY | Facility: CLINIC | Age: 48
End: 2018-11-14
Payer: COMMERCIAL

## 2018-11-14 VITALS
DIASTOLIC BLOOD PRESSURE: 86 MMHG | BODY MASS INDEX: 22.26 KG/M2 | WEIGHT: 159 LBS | HEART RATE: 97 BPM | HEIGHT: 71 IN | SYSTOLIC BLOOD PRESSURE: 130 MMHG

## 2018-11-14 PROCEDURE — 99024 POSTOP FOLLOW-UP VISIT: CPT

## 2018-11-28 ENCOUNTER — APPOINTMENT (OUTPATIENT)
Dept: ORTHOPEDIC SURGERY | Facility: CLINIC | Age: 48
End: 2018-11-28
Payer: COMMERCIAL

## 2018-11-28 PROCEDURE — 73610 X-RAY EXAM OF ANKLE: CPT | Mod: LT

## 2018-11-28 PROCEDURE — 73590 X-RAY EXAM OF LOWER LEG: CPT | Mod: LT

## 2018-11-28 PROCEDURE — 99024 POSTOP FOLLOW-UP VISIT: CPT

## 2018-11-30 ENCOUNTER — MEDICATION RENEWAL (OUTPATIENT)
Age: 48
End: 2018-11-30

## 2018-11-30 ENCOUNTER — RX RENEWAL (OUTPATIENT)
Age: 48
End: 2018-11-30

## 2018-11-30 RX ORDER — OXYCODONE AND ACETAMINOPHEN 5; 325 MG/1; MG/1
5-325 TABLET ORAL
Qty: 60 | Refills: 0 | Status: ACTIVE | COMMUNITY
Start: 2018-11-14 | End: 1900-01-01

## 2018-12-18 RX ORDER — HYDROCODONE BITARTRATE AND ACETAMINOPHEN 5; 325 MG/1; MG/1
5-325 TABLET ORAL EVERY 6 HOURS
Qty: 40 | Refills: 0 | Status: ACTIVE | COMMUNITY
Start: 2018-12-18 | End: 1900-01-01

## 2019-01-07 RX ORDER — TRAZODONE HYDROCHLORIDE 100 MG/1
100 TABLET ORAL
Qty: 30 | Refills: 1 | Status: ACTIVE | COMMUNITY
Start: 2019-01-07 | End: 1900-01-01

## 2019-01-07 RX ORDER — DICLOFENAC SODIUM 75 MG/1
75 TABLET, DELAYED RELEASE ORAL
Qty: 60 | Refills: 1 | Status: ACTIVE | COMMUNITY
Start: 2019-01-07 | End: 1900-01-01

## 2019-01-09 ENCOUNTER — APPOINTMENT (OUTPATIENT)
Dept: ORTHOPEDIC SURGERY | Facility: CLINIC | Age: 49
End: 2019-01-09
Payer: COMMERCIAL

## 2019-01-09 PROCEDURE — 73610 X-RAY EXAM OF ANKLE: CPT | Mod: LT

## 2019-01-09 PROCEDURE — 99024 POSTOP FOLLOW-UP VISIT: CPT

## 2019-01-11 NOTE — HISTORY OF PRESENT ILLNESS
[Clean/Dry/Intact] : clean, dry and intact [Healed] : healed [Neuro Intact] : an unremarkable neurological exam [Vascular Intact] : ~T peripheral vascular exam normal [Negative Maximilian's] : maneuvers demonstrated a negative Maximilian's sign [Doing Well] : is doing well [Excellent Pain Control] : has excellent pain control [No Sign of Infection] : is showing no signs of infection [None] : None [Chills] : no chills [Fever] : no fever [Nausea] : no nausea [Vomiting] : no vomiting [Erythema] : not erythematous [de-identified] : 9 weeks S/P hind foot fusion for distal tibia pilon fracture with traumatic wound closure with sural rotational flap by plastics  [de-identified] : 9 weeks S/P hind foot fusion for distal tibia pilon fracture with traumatic wound closure with rotational flap by plastics doing well complains of muscle spasms has been reluctant to bear weight. presents for followup.  [de-identified] : LEft LE \par + swelling mild ecchymosis \par incisions healed\par flap appears hea;led \par SILT TN/SPN/DPN/SN - subjective numbness on dorsum of foot. \par passive motion knee 0-130 degress \par  [de-identified] : 3 views of the ankle and 2 of the tib/fib show maintence of alignment and fixation w Hindfoot fusion nail in place [de-identified] : 4 weeks S/P hind foot fusion for distal tibia pilon fracture with traumatic wound closure with derotational flap by plastics \par - Begin WBAT (plastics Ok w it)\par - f/u 4 weeks\par given Rx for PT\par

## 2019-02-08 ENCOUNTER — INBOUND DOCUMENT (OUTPATIENT)
Age: 49
End: 2019-02-08

## 2019-02-26 ENCOUNTER — APPOINTMENT (OUTPATIENT)
Dept: ORTHOPEDIC SURGERY | Facility: CLINIC | Age: 49
End: 2019-02-26
Payer: COMMERCIAL

## 2019-03-06 ENCOUNTER — APPOINTMENT (OUTPATIENT)
Dept: ORTHOPEDIC SURGERY | Facility: CLINIC | Age: 49
End: 2019-03-06
Payer: COMMERCIAL

## 2019-03-06 PROCEDURE — 73610 X-RAY EXAM OF ANKLE: CPT | Mod: LT

## 2019-03-06 PROCEDURE — 99213 OFFICE O/P EST LOW 20 MIN: CPT

## 2019-03-07 RX ORDER — TRAMADOL HYDROCHLORIDE 50 MG/1
50 TABLET, COATED ORAL 3 TIMES DAILY
Qty: 40 | Refills: 0 | Status: ACTIVE | COMMUNITY
Start: 2019-03-07 | End: 1900-01-01

## 2019-03-08 NOTE — HISTORY OF PRESENT ILLNESS
[Clean/Dry/Intact] : clean, dry and intact [Healed] : healed [Neuro Intact] : an unremarkable neurological exam [Vascular Intact] : ~T peripheral vascular exam normal [Negative Maximilian's] : maneuvers demonstrated a negative Maximilian's sign [Doing Well] : is doing well [Excellent Pain Control] : has excellent pain control [No Sign of Infection] : is showing no signs of infection [None] : None [Chills] : no chills [Fever] : no fever [Nausea] : no nausea [Vomiting] : no vomiting [Erythema] : not erythematous [de-identified] : 18 weeks S/P hind foot fusion for distal tibia pilon fracture with traumatic wound closure with sural rotational flap by plastics  [de-identified] : 18 weeks S/P hind foot fusion for distal tibia pilon fracture with traumatic wound closure with rotational flap by plastics He reports that he was doing well untill approximately 5 days ago when he walked an entire day. He stats later that night his leg  became swollen, red and extremely painful. He reports one day of 103 degree fever. He states that since that day he has had no further fever, pain has  dissapated slightly and swelling and redness has resolved  [de-identified] : LEft LE \par  mild swelling no ecchymosis \par incisions healed\par flap appears healed \par TTP distal tibia and around the rotational flap\par SILT TN/SPN/DPN/SN - subjective numbness on dorsum of foot. \par passive motion knee 0-130 degrees \par  [de-identified] : 3 views of the ankle and 2 of the tib/fib show maintence of alignment and fixation w Hindfoot fusion nail in place. There is minimal progression of callus formation  [de-identified] : 18 S/P hind foot fusion for distal tibia pilon fracture with traumatic wound closure with derotational flap by plastics. I am concerned about the lack of callus progression and his increase of pain with 1 day of fever. Due to his prior grossly contaminated open fracture I am concerned about possible underlying infection despite there being no erythema no drainage or signs of infection in the office today. \par We will obtain a CT scan to evaluate for nonunion. we will obtain CBC ESR CRP. He will followup after labs and CT for further management.

## 2019-03-12 LAB
BASOPHILS # BLD AUTO: 0.03 K/UL
BASOPHILS NFR BLD AUTO: 0.5 %
CRP SERPL-MCNC: 11.34 MG/DL
EOSINOPHIL # BLD AUTO: 0.06 K/UL
EOSINOPHIL NFR BLD AUTO: 1 %
ERYTHROCYTE [SEDIMENTATION RATE] IN BLOOD BY WESTERGREN METHOD: 58 MM/HR
HCT VFR BLD CALC: 47 %
HGB BLD-MCNC: 15.3 G/DL
IMM GRANULOCYTES NFR BLD AUTO: 0.2 %
LYMPHOCYTES # BLD AUTO: 2.12 K/UL
LYMPHOCYTES NFR BLD AUTO: 33.6 %
MAN DIFF?: NORMAL
MCHC RBC-ENTMCNC: 29.9 PG
MCHC RBC-ENTMCNC: 32.6 GM/DL
MCV RBC AUTO: 92 FL
MONOCYTES # BLD AUTO: 0.55 K/UL
MONOCYTES NFR BLD AUTO: 8.7 %
NEUTROPHILS # BLD AUTO: 3.54 K/UL
NEUTROPHILS NFR BLD AUTO: 56 %
PLATELET # BLD AUTO: 358 K/UL
RBC # BLD: 5.11 M/UL
RBC # FLD: 13.2 %
WBC # FLD AUTO: 6.31 K/UL

## 2019-04-03 ENCOUNTER — APPOINTMENT (OUTPATIENT)
Dept: ORTHOPEDIC SURGERY | Facility: CLINIC | Age: 49
End: 2019-04-03
Payer: COMMERCIAL

## 2019-04-03 PROCEDURE — 73610 X-RAY EXAM OF ANKLE: CPT | Mod: LT

## 2019-04-03 PROCEDURE — 99213 OFFICE O/P EST LOW 20 MIN: CPT

## 2019-04-06 NOTE — HISTORY OF PRESENT ILLNESS
[Chills] : no chills [Fever] : no fever [Nausea] : no nausea [Vomiting] : no vomiting [Clean/Dry/Intact] : clean, dry and intact [Healed] : healed [Erythema] : not erythematous [Neuro Intact] : an unremarkable neurological exam [Vascular Intact] : ~T peripheral vascular exam normal [Negative Maximilian's] : maneuvers demonstrated a negative Maximilian's sign [Doing Well] : is doing well [Excellent Pain Control] : has excellent pain control [No Sign of Infection] : is showing no signs of infection [None] : None [de-identified] : 22 weeks S/P hind foot fusion for distal tibia pilon fracture with traumatic wound closure with sural rotational flap by plastics  [de-identified] : 22 weeks S/P hind foot fusion for distal tibia pilon fracture with traumatic wound closure with rotational flap by plastics. The patient states that the episode that he had prior to last visit, has resolved. He states he is feeling much better. He is able to bear more weight. He has not had any erythema nor drainage from his leg. He has not had any fevers. He is participating in therapy. [de-identified] : LEft LE \par  mild swelling no ecchymosis \par incisions healed\par flap appears healed \par TTP distal tibia and around the rotational flap\par SILT TN/SPN/DPN/SN - subjective numbness on dorsum of foot. \par passive motion knee 0-130 degrees \par  [de-identified] : 3 views of the ankle and 2 of the tib/fib As well as review of CT scan taken 3-15-19 show maintence of alignment and fixation w Hindfoot fusion nail in place. There is progression of callus formation Globally among the multiple fracture fragments. The anterior distal articular piece that was extruded has minimal callus formation associated with it. However, all the fracture fragments appeared to be progressing towards union [de-identified] : 18 S/P hind foot fusion for distal tibia pilon fracture with traumatic wound closure with derotational flap by plastics. He seems, to have over come his episode from last month. The CT scan is encouraging that there may be union of the major fracture fragments in the future. The findings are discussed with the patient. He'll continue to try to rehabilitate and improve his function. We'll see him back in 8 weeks

## 2019-05-29 ENCOUNTER — APPOINTMENT (OUTPATIENT)
Dept: ORTHOPEDIC SURGERY | Facility: CLINIC | Age: 49
End: 2019-05-29
Payer: COMMERCIAL

## 2019-05-29 VITALS — WEIGHT: 159 LBS | HEIGHT: 71 IN | BODY MASS INDEX: 22.26 KG/M2

## 2019-05-29 PROCEDURE — 99213 OFFICE O/P EST LOW 20 MIN: CPT

## 2019-05-29 PROCEDURE — 73610 X-RAY EXAM OF ANKLE: CPT | Mod: LT

## 2019-06-04 NOTE — HISTORY OF PRESENT ILLNESS
[Clean/Dry/Intact] : clean, dry and intact [Healed] : healed [Neuro Intact] : an unremarkable neurological exam [Vascular Intact] : ~T peripheral vascular exam normal [Negative Maximilian's] : maneuvers demonstrated a negative Maximilian's sign [Doing Well] : is doing well [Excellent Pain Control] : has excellent pain control [No Sign of Infection] : is showing no signs of infection [None] : None [Chills] : no chills [Fever] : no fever [Nausea] : no nausea [Vomiting] : no vomiting [Erythema] : not erythematous [de-identified] : 7 months S/P hind foot fusion for distal tibia pilon fracture with traumatic wound closure with sural rotational flap by plastics  [de-identified] : LEft LE \par  swelling no ecchymosis \par incisions healed\par flap appears healed \par TTP distal tibia and around the rotational flap\par SILT TN/SPN/DPN/SN - subjective numbness on dorsum of foot. \par passive motion knee 0-130 degrees \par  [de-identified] : 3 views of the ankle and 2 of the tib/fib show maintenance of alignment and fixation w Hindfoot fusion nail in place. There is progression of callus formation Globally among the multiple fracture fragments. The anterior distal articular piece that was extruded has increased callus formation associated with it. However, all the fracture fragments appeared to be progressing towards union [de-identified] : 7 months S/P hind foot fusion for distal tibia pilon fracture with traumatic wound closure with rotational flap by plastics. He has been using a bone stimulator, He has been participating in PT. He presents ambulating with a cane. He is feeling better, He states that he has been doing research on amputation and wishes to discuss the RBA's  [de-identified] :  S/P hind foot fusion for distal tibia pilon fracture with traumatic wound closure with derotational flap by plastics. a long discussion was had with the patient regarding the risks benefits and alternatives of below knee amputation. it was explained to the patient that amputation is certainly a viable option, however he has not yet met maximal improvement with limb salvage, and has made significant gains since last visit. I recommend at this point in time he continue with rehabilitation as he has not yet met maximal improvement. We will see him back in 3 months for further discussion regarding amputation vs continued limb salvage. He was provided with a new script for therapy. He will followup in 3 months

## 2019-07-10 ENCOUNTER — INBOUND DOCUMENT (OUTPATIENT)
Age: 49
End: 2019-07-10

## 2019-07-30 ENCOUNTER — APPOINTMENT (OUTPATIENT)
Dept: ORTHOPEDIC SURGERY | Facility: CLINIC | Age: 49
End: 2019-07-30
Payer: COMMERCIAL

## 2019-07-30 PROCEDURE — 73610 X-RAY EXAM OF ANKLE: CPT | Mod: LT

## 2019-07-30 PROCEDURE — 73590 X-RAY EXAM OF LOWER LEG: CPT | Mod: LT

## 2019-07-30 PROCEDURE — 99213 OFFICE O/P EST LOW 20 MIN: CPT

## 2019-07-30 NOTE — HISTORY OF PRESENT ILLNESS
[Chills] : no chills [Fever] : no fever [Nausea] : no nausea [Vomiting] : no vomiting [Clean/Dry/Intact] : clean, dry and intact [Healed] : healed [Neuro Intact] : an unremarkable neurological exam [Vascular Intact] : ~T peripheral vascular exam normal [Erythema] : not erythematous [Doing Well] : is doing well [Negative Maximilian's] : maneuvers demonstrated a negative Maximilian's sign [Excellent Pain Control] : has excellent pain control [No Sign of Infection] : is showing no signs of infection [None] : None [de-identified] : 9 months S/P hind foot fusion for distal tibia pilon fracture with traumatic wound closure with rotational flap by plastics. He has been using a bone stimulator, He has been participating in PT. He presents Increases in pain and decreases in function. He states that a small wound has opened up with some drainage on the lateral aspect of his ankle. He denies any fevers. ambulating with a cane.  [de-identified] : 9 months S/P hind foot fusion for distal tibia pilon fracture with traumatic wound closure with sural rotational flap by plastics  [de-identified] : LEft LE \par  swelling no ecchymosis \par incisions healed With the exception of a small 4 x 2 mm area just posterior to his lateral flap. There is some epidermal lysis serous drainage. There does not appear to be any sinus tract\par flap appears healed \par TTP distal tibia and around the rotational flap\par SILT TN/SPN/DPN/SN - subjective numbness on dorsum of foot. \par passive motion knee 0-130 degrees \par  [de-identified] :  S/P hind foot fusion for distal tibia pilon fracture with traumatic wound closure with derotational flap by plastics. I am concerned about possible osteomyelitis in the face of a new wound with drainage. I believe the best course of action is to treat this aggressively. He previously had a wound that responded to p.o. antibiotics. This is a reoccurrence. I would like to debride the bone, remove the nail and interlocked. The loose interlock may be a sign of infection. There appears to be no healing on x-ray and CT that he does not need any additional hardware replaced. He'll present to the hospital this Saturday, couple days before surgery so we can obtain a CT scan, laboratories and an MRI before proceeding with surgery. He understands and agrees with the plan [de-identified] : 3 views of the ankle and 2 of the tib/fib show maintenance of alignment and fixation w Hindfoot fusion nail in place. We interlocked through the nail and into the talus appears to be backing out. This is at the area where there is a wound drainage on the leg.   There is progression of callus formation Globally among the multiple fracture fragments. The anterior distal articular piece that was extruded has increased callus formation associated with it. However, all the fracture fragments appeared to be united

## 2019-08-01 ENCOUNTER — CLINICAL ADVICE (OUTPATIENT)
Age: 49
End: 2019-08-01

## 2019-08-03 ENCOUNTER — INPATIENT (INPATIENT)
Facility: HOSPITAL | Age: 49
LOS: 6 days | Discharge: HOME | DRG: 493 | End: 2019-08-10
Attending: ORTHOPAEDIC SURGERY | Admitting: ORTHOPAEDIC SURGERY
Payer: COMMERCIAL

## 2019-08-03 VITALS
TEMPERATURE: 98 F | WEIGHT: 158.95 LBS | RESPIRATION RATE: 17 BRPM | DIASTOLIC BLOOD PRESSURE: 89 MMHG | HEIGHT: 71 IN | SYSTOLIC BLOOD PRESSURE: 146 MMHG | OXYGEN SATURATION: 98 % | HEART RATE: 62 BPM

## 2019-08-03 DIAGNOSIS — S90.512A ABRASION, LEFT ANKLE, INITIAL ENCOUNTER: ICD-10-CM

## 2019-08-03 LAB
ALBUMIN SERPL ELPH-MCNC: 4.4 G/DL — SIGNIFICANT CHANGE UP (ref 3.3–5)
ALP SERPL-CCNC: 143 U/L — HIGH (ref 40–120)
ALT FLD-CCNC: 62 U/L — HIGH (ref 10–45)
ANION GAP SERPL CALC-SCNC: 14 MMOL/L — SIGNIFICANT CHANGE UP (ref 5–17)
APPEARANCE UR: CLEAR — SIGNIFICANT CHANGE UP
APTT BLD: 36.6 SEC — HIGH (ref 27.5–36.3)
AST SERPL-CCNC: 31 U/L — SIGNIFICANT CHANGE UP (ref 10–40)
BASOPHILS # BLD AUTO: 0 K/UL — SIGNIFICANT CHANGE UP (ref 0–0.2)
BASOPHILS NFR BLD AUTO: 0.4 % — SIGNIFICANT CHANGE UP (ref 0–2)
BILIRUB SERPL-MCNC: 0.2 MG/DL — SIGNIFICANT CHANGE UP (ref 0.2–1.2)
BILIRUB UR-MCNC: NEGATIVE — SIGNIFICANT CHANGE UP
BLD GP AB SCN SERPL QL: NEGATIVE — SIGNIFICANT CHANGE UP
BUN SERPL-MCNC: 15 MG/DL — SIGNIFICANT CHANGE UP (ref 7–23)
CALCIUM SERPL-MCNC: 10.2 MG/DL — SIGNIFICANT CHANGE UP (ref 8.4–10.5)
CHLORIDE SERPL-SCNC: 95 MMOL/L — LOW (ref 96–108)
CO2 SERPL-SCNC: 27 MMOL/L — SIGNIFICANT CHANGE UP (ref 22–31)
COLOR SPEC: SIGNIFICANT CHANGE UP
CREAT SERPL-MCNC: 0.93 MG/DL — SIGNIFICANT CHANGE UP (ref 0.5–1.3)
DIFF PNL FLD: NEGATIVE — SIGNIFICANT CHANGE UP
EOSINOPHIL # BLD AUTO: 0.1 K/UL — SIGNIFICANT CHANGE UP (ref 0–0.5)
EOSINOPHIL NFR BLD AUTO: 1.8 % — SIGNIFICANT CHANGE UP (ref 0–6)
ERYTHROCYTE [SEDIMENTATION RATE] IN BLOOD: 31 MM/HR — HIGH (ref 0–15)
GLUCOSE SERPL-MCNC: 93 MG/DL — SIGNIFICANT CHANGE UP (ref 70–99)
GLUCOSE UR QL: NEGATIVE — SIGNIFICANT CHANGE UP
HCT VFR BLD CALC: 46 % — SIGNIFICANT CHANGE UP (ref 39–50)
HGB BLD-MCNC: 15.6 G/DL — SIGNIFICANT CHANGE UP (ref 13–17)
INR BLD: 1 RATIO — SIGNIFICANT CHANGE UP (ref 0.88–1.16)
KETONES UR-MCNC: NEGATIVE — SIGNIFICANT CHANGE UP
LEUKOCYTE ESTERASE UR-ACNC: NEGATIVE — SIGNIFICANT CHANGE UP
LYMPHOCYTES # BLD AUTO: 2.5 K/UL — SIGNIFICANT CHANGE UP (ref 1–3.3)
LYMPHOCYTES # BLD AUTO: 47.9 % — HIGH (ref 13–44)
MCHC RBC-ENTMCNC: 31.6 PG — SIGNIFICANT CHANGE UP (ref 27–34)
MCHC RBC-ENTMCNC: 34 GM/DL — SIGNIFICANT CHANGE UP (ref 32–36)
MCV RBC AUTO: 93 FL — SIGNIFICANT CHANGE UP (ref 80–100)
MONOCYTES # BLD AUTO: 0.5 K/UL — SIGNIFICANT CHANGE UP (ref 0–0.9)
MONOCYTES NFR BLD AUTO: 9.7 % — SIGNIFICANT CHANGE UP (ref 2–14)
NEUTROPHILS # BLD AUTO: 2.1 K/UL — SIGNIFICANT CHANGE UP (ref 1.8–7.4)
NEUTROPHILS NFR BLD AUTO: 40.1 % — LOW (ref 43–77)
NITRITE UR-MCNC: NEGATIVE — SIGNIFICANT CHANGE UP
PH UR: 6 — SIGNIFICANT CHANGE UP (ref 5–8)
PLATELET # BLD AUTO: 321 K/UL — SIGNIFICANT CHANGE UP (ref 150–400)
POTASSIUM SERPL-MCNC: 4 MMOL/L — SIGNIFICANT CHANGE UP (ref 3.5–5.3)
POTASSIUM SERPL-SCNC: 4 MMOL/L — SIGNIFICANT CHANGE UP (ref 3.5–5.3)
PROT SERPL-MCNC: 8.3 G/DL — SIGNIFICANT CHANGE UP (ref 6–8.3)
PROT UR-MCNC: SIGNIFICANT CHANGE UP
PROTHROM AB SERPL-ACNC: 11.4 SEC — SIGNIFICANT CHANGE UP (ref 10–12.9)
RBC # BLD: 4.94 M/UL — SIGNIFICANT CHANGE UP (ref 4.2–5.8)
RBC # FLD: 11 % — SIGNIFICANT CHANGE UP (ref 10.3–14.5)
RH IG SCN BLD-IMP: POSITIVE — SIGNIFICANT CHANGE UP
SODIUM SERPL-SCNC: 136 MMOL/L — SIGNIFICANT CHANGE UP (ref 135–145)
SP GR SPEC: 1.03 — HIGH (ref 1.01–1.02)
UROBILINOGEN FLD QL: NEGATIVE — SIGNIFICANT CHANGE UP
WBC # BLD: 5.2 K/UL — SIGNIFICANT CHANGE UP (ref 3.8–10.5)
WBC # FLD AUTO: 5.2 K/UL — SIGNIFICANT CHANGE UP (ref 3.8–10.5)

## 2019-08-03 PROCEDURE — 71045 X-RAY EXAM CHEST 1 VIEW: CPT | Mod: 26

## 2019-08-03 PROCEDURE — 73700 CT LOWER EXTREMITY W/O DYE: CPT | Mod: 26,LT

## 2019-08-03 PROCEDURE — 99285 EMERGENCY DEPT VISIT HI MDM: CPT

## 2019-08-03 PROCEDURE — 76377 3D RENDER W/INTRP POSTPROCES: CPT | Mod: 26

## 2019-08-03 RX ORDER — ACETAMINOPHEN 500 MG
650 TABLET ORAL EVERY 6 HOURS
Refills: 0 | Status: DISCONTINUED | OUTPATIENT
Start: 2019-08-03 | End: 2019-08-05

## 2019-08-03 RX ORDER — OXYCODONE HYDROCHLORIDE 5 MG/1
10 TABLET ORAL EVERY 4 HOURS
Refills: 0 | Status: DISCONTINUED | OUTPATIENT
Start: 2019-08-03 | End: 2019-08-05

## 2019-08-03 RX ORDER — DOCUSATE SODIUM 100 MG
100 CAPSULE ORAL THREE TIMES A DAY
Refills: 0 | Status: DISCONTINUED | OUTPATIENT
Start: 2019-08-03 | End: 2019-08-05

## 2019-08-03 RX ORDER — ENOXAPARIN SODIUM 100 MG/ML
40 INJECTION SUBCUTANEOUS DAILY
Refills: 0 | Status: COMPLETED | OUTPATIENT
Start: 2019-08-04 | End: 2019-08-04

## 2019-08-03 RX ORDER — VANCOMYCIN HCL 1 G
1000 VIAL (EA) INTRAVENOUS EVERY 12 HOURS
Refills: 0 | Status: DISCONTINUED | OUTPATIENT
Start: 2019-08-03 | End: 2019-08-04

## 2019-08-03 RX ORDER — OXYCODONE HYDROCHLORIDE 5 MG/1
5 TABLET ORAL EVERY 4 HOURS
Refills: 0 | Status: DISCONTINUED | OUTPATIENT
Start: 2019-08-03 | End: 2019-08-05

## 2019-08-03 RX ORDER — MAGNESIUM HYDROXIDE 400 MG/1
30 TABLET, CHEWABLE ORAL DAILY
Refills: 0 | Status: DISCONTINUED | OUTPATIENT
Start: 2019-08-03 | End: 2019-08-05

## 2019-08-03 RX ORDER — PIPERACILLIN AND TAZOBACTAM 4; .5 G/20ML; G/20ML
3.38 INJECTION, POWDER, LYOPHILIZED, FOR SOLUTION INTRAVENOUS EVERY 8 HOURS
Refills: 0 | Status: DISCONTINUED | OUTPATIENT
Start: 2019-08-03 | End: 2019-08-04

## 2019-08-03 RX ADMIN — Medication 250 MILLIGRAM(S): at 23:36

## 2019-08-03 RX ADMIN — PIPERACILLIN AND TAZOBACTAM 25 GRAM(S): 4; .5 INJECTION, POWDER, LYOPHILIZED, FOR SOLUTION INTRAVENOUS at 23:37

## 2019-08-03 NOTE — ED PROVIDER NOTE - ATTENDING CONTRIBUTION TO CARE
49M sent to ED by his orthopedic surgeon Dr. Diaz for admission for IV abx for osteomyelitis. Patient states he scraped his ankle about 1 week ago and has developed a lesion that started to look infected, with purulent drainage, edema and erythema. Patient reports +ankle pain that has been stable for a while, dull, non-radiating, exacerbated by pressure, alleviated with rest. States that he was supposed to have surgery this past week but needed a later date so had to re-schedule. Ortho recommended coming in for IV Abx.     ROS:  GEN: (-) fevers/chills, (-) weight loss, (-) fatigue/malaise  HEENT: (-) visual change  NECK: (-) stiffness, (-) swelling  RESP: (-) shortness of breath, (-) cough  CV: (-) chest pain, (-) palpitations  GI: (-) nausea, (-) vomiting, (-) pain, (-) constipation, (-) diarrhea  : (-) dysuria  EXT: (-) edema, (-) cyanosis  SKIN (+) discharge  ENDO:  (-) polyuria  NEURO: (-) paresthesias, (-) weakness, (-) headache, (-) dizziness, (-) syncope  MSK: (+) ankle pain    PMHx: Debridement of necrotic tissue of L pilon fx ex-fix 2018  PSHx: G3B L pilon fx sp exfix (10/8/18, OSH), I&D, revision ex-fix (10/28/18), ankle fusion & ORIF (11/1/18), soleus flap (11/2/18)  Allergies: NKDA  SocHx: Denies ETOH/drugs/smoking    VITALS REVIEWED.  Afebrile, slightly hypertensive, otherwise normal  GENERALIZED APPEARANCE:  Comfortable, no acute distress, lying in bed  SKIN:  Warm, dry, no cyanosis  HEAD:  No obvious scalp lesions  EYES:  Conjunctiva pink, no icterus  ENMT:  Mucus membranes moist, no stridor  NECK:  Supple, non-tender  CHEST AND RESPIRATORY:  Clear to auscultation B/L, good air entry B/L, equal chest expansion  HEART AND CARDIOVASCULAR:  Regular rate, no obvious murmur  ABDOMEN AND GI:  Soft, non-tender, non-distended.  No rebound, no guarding, no CVA-area tenderness  EXTREMITIES:  +Erythema, edema, purulent lesion on L ankle, compartments soft, no calf tenderness, warm well perfused, cap refill <2s  NEURO: AAOx3, gross motor and sensory intact  PSYCH: Normal affect    Impression:  R/o osteomyelitis, purulent drainage L ankle s/p multiple surgeries in the past, infected ulcer  Labs, imaging, admit to Ortho for IV Abx

## 2019-08-03 NOTE — H&P ADULT - ASSESSMENT
49y Male hx of G3B L pilon fx sp exfix (10/8/'18, OSH), I&D, rev exfix (10/28/'18), ankle fusion & ORIF (11/1/'18), soleus flap (11/2/'18) with purulent drainage left ankle and increased pain concerning for infection.    - FU CT L ankle  - FU MRI L ankle w/ or w/o contrast  - plan for OR Monday  - medical clearance pending  - preop labs, imaging, ekg  - IV abx  - ID consult

## 2019-08-03 NOTE — ED ADULT NURSE NOTE - NSIMPLEMENTINTERV_GEN_ALL_ED
Implemented All Fall Risk Interventions:  [place] to call system. Call bell, personal items and telephone within reach. Instruct patient to call for assistance. Room bathroom lighting operational. Non-slip footwear when patient is off stretcher. Physically safe environment: no spills, clutter or unnecessary equipment. Stretcher in lowest position, wheels locked, appropriate side rails in place. Provide visual cue, wrist band, yellow gown, etc. Monitor gait and stability. Monitor for mental status changes and reorient to person, place, and time. Review medications for side effects contributing to fall risk. Reinforce activity limits and safety measures with patient and family.

## 2019-08-03 NOTE — H&P ADULT - HISTORY OF PRESENT ILLNESS
Orthopaedic Surgery Consult Note    Chief Complaint:    HPI:  49yMale hx of G3B L pilon fx sp exfix (10/8/'18, OSH), I&D, rev exfix (10/28/'18), ankle fusion & ORIF (11/1/'18), soleus flap (11/2/'18) presenting with purulent drainage lateral ankle for a few days and increase left ankle pain for approximately 2 weeks. Denies f/c. No other complaints. Was told to come to ED for admission by Dr. Diaz's office    ROS: As documented in HPI, otherwise negative.    PAST MEDICAL & SURGICAL HISTORY:  No pertinent past medical history  No significant past surgical history    [] No significant past history as reviewed with the patient and family    MEDICATIONS  (STANDING):  docusate sodium 100 milliGRAM(s) Oral three times a day  piperacillin/tazobactam IVPB.. 3.375 Gram(s) IV Intermittent every 8 hours  vancomycin  IVPB 1000 milliGRAM(s) IV Intermittent every 12 hours    MEDICATIONS  (PRN):  acetaminophen   Tablet .. 650 milliGRAM(s) Oral every 6 hours PRN Temp greater or equal to 38C (100.4F), Mild Pain (1 - 3)  magnesium hydroxide Suspension 30 milliLiter(s) Oral daily PRN Constipation  oxyCODONE    IR 10 milliGRAM(s) Oral every 4 hours PRN Severe Pain (7 - 10)  oxyCODONE    IR 5 milliGRAM(s) Oral every 4 hours PRN Moderate Pain (4 - 6)    Allergies    No Known Allergies    Intolerances        Vital Signs Last 24 Hrs  T(C): 36.8 (03 Aug 2019 21:45), Max: 36.8 (03 Aug 2019 21:45)  T(F): 98.3 (03 Aug 2019 21:45), Max: 98.3 (03 Aug 2019 21:45)  HR: 59 (03 Aug 2019 21:45) (59 - 62)  BP: 137/80 (03 Aug 2019 21:45) (137/80 - 146/89)  BP(mean): --  RR: 16 (03 Aug 2019 21:45) (16 - 17)  SpO2: 98% (03 Aug 2019 21:45) (98% - 98%)      PHYSICAL EXAM:    Gen: awake, alert, NAD  Resp: no increased work of breathing  LLE:  - small area of purulent drainage from left ankle  +EHL/FHL  WWP  Compartments soft  No calf TTP                           15.6   5.2   )-----------( 321      ( 03 Aug 2019 20:23 )             46.0     08-03    136  |  95<L>  |  15  ----------------------------<  93  4.0   |  27  |  0.93    Ca    10.2      03 Aug 2019 20:23    TPro  8.3  /  Alb  4.4  /  TBili  0.2  /  DBili  x   /  AST  31  /  ALT  62<H>  /  AlkPhos  143<H>  08-03    PT/INR - ( 03 Aug 2019 20:23 )   PT: 11.4 sec;   INR: 1.00 ratio         PTT - ( 03 Aug 2019 20:23 )  PTT:36.6 sec

## 2019-08-03 NOTE — ED ADULT NURSE NOTE - OBJECTIVE STATEMENT
Patient   is  alert   and  oriented   x3.  Color  is  good  and  skin   warm  to touch.   He  had  an  injury and  surgery  to  left  ankle  1  year  ago.  Patient  still  has  a  wound  to  the  affected  area.   Swelling  and  yellowish  drainage  noted.  Patient  has  been  afebrile.

## 2019-08-03 NOTE — ED PROVIDER NOTE - OBJECTIVE STATEMENT
49M 49M with 49M with w/ L ankle fusion 1 year ago sent in to the ED by his orthopedic surgeon Dr. Diaz for osteo. He scraped up his ankle about 1 week ago, has a lesion that started to look infected +purulence of lesion, edema, erythema. He was originally supposed to have the surgery this past week but needed a later date. Dr. Diaz recommended to come to the ED on saturday for admission for surgery on Monday. Patient states L ankle pain has been stable, no increase. No increased purulence or swelling this past week. No fevers, chills, nausea, vomiting, increased trouble ambulating.

## 2019-08-03 NOTE — ED PROVIDER NOTE - NS ED ROS FT
CONST: no fevers, no chills, no trauma  EYES: no pain, no visual disturbances  ENT: no sore throat, no epistaxis, no rhinorrhea, no hearing changes  CV: no chest pain, no palpitations, no orthopnea, no extremity pain or swelling  RESP: no shortness of breath, no cough, no sputum, no pleurisy, no wheezing  ABD: no abdominal pain, no nausea, no vomiting, no diarrhea, no black or bloody stool  : no dysuria, no hematuria, no frequency, no urgency  MSK: no back pain, no neck pain, no extremity pain  NEURO: no headache, no sensory disturbances, no focal weakness, no dizziness  HEME: no easy bleeding or bruising  SKIN: no diaphoresis, no rash CONST: no fevers, no chills, no trauma  EYES: no pain, no visual disturbances  ENT: no sore throat, no epistaxis, no rhinorrhea, no hearing changes  CV: no chest pain, no palpitations, no orthopnea, + LLE extremity pain and swelling  RESP: no shortness of breath, no cough, no sputum, no pleurisy, no wheezing  ABD: no abdominal pain, no nausea, no vomiting, no diarrhea, no black or bloody stool  : no dysuria, no hematuria, no frequency, no urgency  MSK: +L ankle pain, no back pain, no neck pain  NEURO: no headache, no sensory disturbances of b/l LE, no focal weakness, no dizziness  HEME: no easy bleeding or bruising  SKIN: no diaphoresis

## 2019-08-03 NOTE — ED PROVIDER NOTE - PHYSICAL EXAMINATION
1. Const: Well-nourished, Well-developed (WNWD), appearing stated age.  2. Eyes: PERRL, no conjunctival injection, and symmetrical lids.  3. HEENT: Head NCAT, no lesions. Atraumatic external nose and ears. Moist MM.  * Neck: Symmetric, trachea midline, No thyromegaly.  4. CVS: +S1/S2, Peripheral pulses 2+ and equal in all extremities.  5. RESP: Unlabored respiratory effort. Clear to auscultation bilaterally (CTAB).  6. GI: Nontender/Nondistended (NTND), No hepatosplenomegaly (HSM).  7. MSK: + erythema, edema, purulent lesion on left ankle, Normocephalic/Atraumatic (NC/AT)  8. Skin: Open lesion in left ankle, draining yellow pus. No rashes or lesions.  9. Neuro: CNs II-XII grossly intact. Motor & Sensation grossly intact.  10. Psych: Awake, Alert, & Oriented (AAO) x3. Appropriate mood and affect. 1. Const: Well-nourished, Well-developed (WNWD), appearing stated age.  2. Eyes: PERRL, no conjunctival injection, and symmetrical lids.  3. HEENT: Head NCAT, no lesions. Atraumatic external nose and ears. Moist MM.  * Neck: Symmetric, trachea midline, No thyromegaly.  4. CVS: +S1/S2, Peripheral pulses 2+ and equal in all extremities.  5. RESP: Unlabored respiratory effort. Clear to auscultation bilaterally (CTAB).  6. GI: Nontender/Nondistended (NTND), No hepatosplenomegaly (HSM).  7. MSK: + erythema, edema, purulent lesion on left ankle, Normocephalic/Atraumatic (NC/AT)  8. Skin: Open lesion in left medial ankle, draining yellow pus.  9. Neuro: CNs II-XII grossly intact. Motor & Sensation grossly intact.  10. Psych: Awake, Alert, & Oriented (AAO) x3. Appropriate mood and affect. VITALS REVIEWED.  Afebrile, slightly hypertensive, otherwise normal  GENERALIZED APPEARANCE:  Comfortable, no acute distress, lying in bed  SKIN:  Warm, dry, no cyanosis  HEAD:  No obvious scalp lesions  EYES:  Conjunctiva pink, no icterus  ENMT:  Mucus membranes moist, no stridor  NECK:  Supple, non-tender  CHEST AND RESPIRATORY:  Clear to auscultation B/L, good air entry B/L, equal chest expansion  HEART AND CARDIOVASCULAR:  Regular rate, no obvious murmur  ABDOMEN AND GI:  Soft, non-tender, non-distended.  No rebound, no guarding, no CVA-area tenderness  EXTREMITIES:  +Erythema, edema, purulent lesion on L ankle, compartments soft, no calf tenderness, warm well perfused, cap refill <2s  NEURO: AAOx3, gross motor and sensory intact  PSYCH: Appropriate mood and affect

## 2019-08-03 NOTE — ED PROVIDER NOTE - CLINICAL SUMMARY MEDICAL DECISION MAKING FREE TEXT BOX
49M hx of synthetic joint presenting with CC of ankle pain and swelling of L ankle (s/p ankle fusion surgery 1 yr ago). +purulent drainage - systemic symptoms. Pt's ortho surgeon Dr. Diaz told him to come  to the ED on Saturday for admission for repeat surgery on Monday. PE: vitals stable, + warm, erythematous, edematous left ankle, with purulence.     Ddx: Pain 2/2 to osteo vs infection of ankle s/p ankle fusion   Plan: Pre Op work up, CT LLE knee down

## 2019-08-04 ENCOUNTER — TRANSCRIPTION ENCOUNTER (OUTPATIENT)
Age: 49
End: 2019-08-04

## 2019-08-04 LAB
ANION GAP SERPL CALC-SCNC: 13 MMOL/L — SIGNIFICANT CHANGE UP (ref 5–17)
APTT BLD: 35.2 SEC — SIGNIFICANT CHANGE UP (ref 27.5–36.3)
BUN SERPL-MCNC: 13 MG/DL — SIGNIFICANT CHANGE UP (ref 7–23)
CALCIUM SERPL-MCNC: 9.5 MG/DL — SIGNIFICANT CHANGE UP (ref 8.4–10.5)
CHLORIDE SERPL-SCNC: 97 MMOL/L — SIGNIFICANT CHANGE UP (ref 96–108)
CO2 SERPL-SCNC: 26 MMOL/L — SIGNIFICANT CHANGE UP (ref 22–31)
CREAT SERPL-MCNC: 1.11 MG/DL — SIGNIFICANT CHANGE UP (ref 0.5–1.3)
CRP SERPL-MCNC: 1.34 MG/DL — HIGH (ref 0–0.4)
GLUCOSE SERPL-MCNC: 90 MG/DL — SIGNIFICANT CHANGE UP (ref 70–99)
HCT VFR BLD CALC: 42.5 % — SIGNIFICANT CHANGE UP (ref 39–50)
HGB BLD-MCNC: 14.9 G/DL — SIGNIFICANT CHANGE UP (ref 13–17)
INR BLD: 1 RATIO — SIGNIFICANT CHANGE UP (ref 0.88–1.16)
MCHC RBC-ENTMCNC: 31.8 PG — SIGNIFICANT CHANGE UP (ref 27–34)
MCHC RBC-ENTMCNC: 35.1 GM/DL — SIGNIFICANT CHANGE UP (ref 32–36)
MCV RBC AUTO: 90.8 FL — SIGNIFICANT CHANGE UP (ref 80–100)
PLATELET # BLD AUTO: 315 K/UL — SIGNIFICANT CHANGE UP (ref 150–400)
POTASSIUM SERPL-MCNC: 3.8 MMOL/L — SIGNIFICANT CHANGE UP (ref 3.5–5.3)
POTASSIUM SERPL-SCNC: 3.8 MMOL/L — SIGNIFICANT CHANGE UP (ref 3.5–5.3)
PROTHROM AB SERPL-ACNC: 11.5 SEC — SIGNIFICANT CHANGE UP (ref 10–13.1)
RBC # BLD: 4.68 M/UL — SIGNIFICANT CHANGE UP (ref 4.2–5.8)
RBC # FLD: 11.7 % — SIGNIFICANT CHANGE UP (ref 10.3–14.5)
SODIUM SERPL-SCNC: 136 MMOL/L — SIGNIFICANT CHANGE UP (ref 135–145)
WBC # BLD: 4.26 K/UL — SIGNIFICANT CHANGE UP (ref 3.8–10.5)
WBC # FLD AUTO: 4.26 K/UL — SIGNIFICANT CHANGE UP (ref 3.8–10.5)

## 2019-08-04 RX ORDER — SODIUM CHLORIDE 9 MG/ML
1000 INJECTION INTRAMUSCULAR; INTRAVENOUS; SUBCUTANEOUS
Refills: 0 | Status: DISCONTINUED | OUTPATIENT
Start: 2019-08-05 | End: 2019-08-10

## 2019-08-04 RX ADMIN — ENOXAPARIN SODIUM 40 MILLIGRAM(S): 100 INJECTION SUBCUTANEOUS at 11:45

## 2019-08-04 RX ADMIN — OXYCODONE HYDROCHLORIDE 5 MILLIGRAM(S): 5 TABLET ORAL at 08:03

## 2019-08-04 RX ADMIN — OXYCODONE HYDROCHLORIDE 5 MILLIGRAM(S): 5 TABLET ORAL at 08:35

## 2019-08-04 RX ADMIN — PIPERACILLIN AND TAZOBACTAM 25 GRAM(S): 4; .5 INJECTION, POWDER, LYOPHILIZED, FOR SOLUTION INTRAVENOUS at 08:03

## 2019-08-04 NOTE — PROGRESS NOTE ADULT - SUBJECTIVE AND OBJECTIVE BOX
Patient resting without complaints.  No fever, chills, chest pain, SOB, N/V.    T(C): 36.6 (08-04-19 @ 05:23), Max: 36.8 (08-03-19 @ 21:45)  HR: 68 (08-04-19 @ 05:23) (59 - 68)  BP: 132/84 (08-04-19 @ 05:23) (132/84 - 146/89)  RR: 18 (08-04-19 @ 05:23) (16 - 18)  SpO2: 98% (08-04-19 @ 05:23) (98% - 98%)      Exam:  Alert and Oriented, No Acute Distress  Cards: +S1/S2, RRR  Pulm: CTAB  Lower Extremities:  LLE: Scant amount of purulent drainage noted on lateral ankle dressing, dull sensation grossly intact,  2+DP, Toes warm and mobile with brisk cap refill, +DF/+PF  Calves Soft, Non-tender bilaterally                            15.6   5.2   )-----------( 321      ( 03 Aug 2019 20:23 )             46.0    08-03    136  |  95<L>  |  15  ----------------------------<  93  4.0   |  27  |  0.93    Ca    10.2      03 Aug 2019 20:23    TPro  8.3  /  Alb  4.4  /  TBili  0.2  /  DBili  x   /  AST  31  /  ALT  62<H>  /  AlkPhos  143<H>  08-03
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
## 2019-08-05 ENCOUNTER — APPOINTMENT (OUTPATIENT)
Dept: ORTHOPEDIC SURGERY | Facility: HOSPITAL | Age: 49
End: 2019-08-05

## 2019-08-05 DIAGNOSIS — R52 PAIN, UNSPECIFIED: ICD-10-CM

## 2019-08-05 DIAGNOSIS — S90.512A ABRASION, LEFT ANKLE, INITIAL ENCOUNTER: ICD-10-CM

## 2019-08-05 LAB
ANION GAP SERPL CALC-SCNC: 13 MMOL/L — SIGNIFICANT CHANGE UP (ref 5–17)
ANION GAP SERPL CALC-SCNC: 16 MMOL/L — SIGNIFICANT CHANGE UP (ref 5–17)
APTT BLD: 37.3 SEC — HIGH (ref 27.5–36.3)
BLD GP AB SCN SERPL QL: NEGATIVE — SIGNIFICANT CHANGE UP
BUN SERPL-MCNC: 11 MG/DL — SIGNIFICANT CHANGE UP (ref 7–23)
BUN SERPL-MCNC: 14 MG/DL — SIGNIFICANT CHANGE UP (ref 7–23)
CALCIUM SERPL-MCNC: 9.3 MG/DL — SIGNIFICANT CHANGE UP (ref 8.4–10.5)
CALCIUM SERPL-MCNC: 9.9 MG/DL — SIGNIFICANT CHANGE UP (ref 8.4–10.5)
CHLORIDE SERPL-SCNC: 95 MMOL/L — LOW (ref 96–108)
CHLORIDE SERPL-SCNC: 97 MMOL/L — SIGNIFICANT CHANGE UP (ref 96–108)
CO2 SERPL-SCNC: 27 MMOL/L — SIGNIFICANT CHANGE UP (ref 22–31)
CO2 SERPL-SCNC: 27 MMOL/L — SIGNIFICANT CHANGE UP (ref 22–31)
CREAT SERPL-MCNC: 0.89 MG/DL — SIGNIFICANT CHANGE UP (ref 0.5–1.3)
CREAT SERPL-MCNC: 1.05 MG/DL — SIGNIFICANT CHANGE UP (ref 0.5–1.3)
GLUCOSE SERPL-MCNC: 162 MG/DL — HIGH (ref 70–99)
GLUCOSE SERPL-MCNC: 96 MG/DL — SIGNIFICANT CHANGE UP (ref 70–99)
HCT VFR BLD CALC: 47.7 % — SIGNIFICANT CHANGE UP (ref 39–50)
HCT VFR BLD CALC: 48.1 % — SIGNIFICANT CHANGE UP (ref 39–50)
HGB BLD-MCNC: 15.8 G/DL — SIGNIFICANT CHANGE UP (ref 13–17)
HGB BLD-MCNC: 16 G/DL — SIGNIFICANT CHANGE UP (ref 13–17)
INR BLD: 0.99 RATIO — SIGNIFICANT CHANGE UP (ref 0.88–1.16)
MCHC RBC-ENTMCNC: 30.5 PG — SIGNIFICANT CHANGE UP (ref 27–34)
MCHC RBC-ENTMCNC: 31 PG — SIGNIFICANT CHANGE UP (ref 27–34)
MCHC RBC-ENTMCNC: 33.2 GM/DL — SIGNIFICANT CHANGE UP (ref 32–36)
MCHC RBC-ENTMCNC: 33.3 GM/DL — SIGNIFICANT CHANGE UP (ref 32–36)
MCV RBC AUTO: 92 FL — SIGNIFICANT CHANGE UP (ref 80–100)
MCV RBC AUTO: 93.1 FL — SIGNIFICANT CHANGE UP (ref 80–100)
PLATELET # BLD AUTO: 331 K/UL — SIGNIFICANT CHANGE UP (ref 150–400)
PLATELET # BLD AUTO: 335 K/UL — SIGNIFICANT CHANGE UP (ref 150–400)
POTASSIUM SERPL-MCNC: 4.2 MMOL/L — SIGNIFICANT CHANGE UP (ref 3.5–5.3)
POTASSIUM SERPL-MCNC: 4.2 MMOL/L — SIGNIFICANT CHANGE UP (ref 3.5–5.3)
POTASSIUM SERPL-SCNC: 4.2 MMOL/L — SIGNIFICANT CHANGE UP (ref 3.5–5.3)
POTASSIUM SERPL-SCNC: 4.2 MMOL/L — SIGNIFICANT CHANGE UP (ref 3.5–5.3)
PROTHROM AB SERPL-ACNC: 11.4 SEC — SIGNIFICANT CHANGE UP (ref 10–12.9)
RBC # BLD: 5.17 M/UL — SIGNIFICANT CHANGE UP (ref 4.2–5.8)
RBC # BLD: 5.18 M/UL — SIGNIFICANT CHANGE UP (ref 4.2–5.8)
RBC # FLD: 11 % — SIGNIFICANT CHANGE UP (ref 10.3–14.5)
RBC # FLD: 11.2 % — SIGNIFICANT CHANGE UP (ref 10.3–14.5)
RH IG SCN BLD-IMP: POSITIVE — SIGNIFICANT CHANGE UP
SODIUM SERPL-SCNC: 137 MMOL/L — SIGNIFICANT CHANGE UP (ref 135–145)
SODIUM SERPL-SCNC: 138 MMOL/L — SIGNIFICANT CHANGE UP (ref 135–145)
WBC # BLD: 10.9 K/UL — HIGH (ref 3.8–10.5)
WBC # BLD: 4.7 K/UL — SIGNIFICANT CHANGE UP (ref 3.8–10.5)
WBC # FLD AUTO: 10.9 K/UL — HIGH (ref 3.8–10.5)
WBC # FLD AUTO: 4.7 K/UL — SIGNIFICANT CHANGE UP (ref 3.8–10.5)

## 2019-08-05 PROCEDURE — 73723 MRI JOINT LWR EXTR W/O&W/DYE: CPT | Mod: 26,LT

## 2019-08-05 PROCEDURE — 20680 REMOVAL OF IMPLANT DEEP: CPT | Mod: 59,LT

## 2019-08-05 PROCEDURE — 73720 MRI LWR EXTREMITY W/O&W/DYE: CPT | Mod: 26,LT,76

## 2019-08-05 PROCEDURE — 11403 EXC TR-EXT B9+MARG 2.1-3CM: CPT | Mod: LT

## 2019-08-05 PROCEDURE — 27641 PARTIAL REMOVAL OF FIBULA: CPT | Mod: 59,LT

## 2019-08-05 PROCEDURE — 27640 PARTIAL REMOVAL OF TIBIA: CPT | Mod: LT

## 2019-08-05 PROCEDURE — 11981 INSERTION DRUG DLVR IMPLANT: CPT | Mod: LT

## 2019-08-05 RX ORDER — OXYCODONE HYDROCHLORIDE 5 MG/1
5 TABLET ORAL EVERY 4 HOURS
Refills: 0 | Status: DISCONTINUED | OUTPATIENT
Start: 2019-08-05 | End: 2019-08-06

## 2019-08-05 RX ORDER — OXYCODONE HYDROCHLORIDE 5 MG/1
10 TABLET ORAL EVERY 4 HOURS
Refills: 0 | Status: DISCONTINUED | OUTPATIENT
Start: 2019-08-05 | End: 2019-08-06

## 2019-08-05 RX ORDER — MAGNESIUM HYDROXIDE 400 MG/1
30 TABLET, CHEWABLE ORAL DAILY
Refills: 0 | Status: DISCONTINUED | OUTPATIENT
Start: 2019-08-05 | End: 2019-08-10

## 2019-08-05 RX ORDER — ASCORBIC ACID 60 MG
500 TABLET,CHEWABLE ORAL
Refills: 0 | Status: DISCONTINUED | OUTPATIENT
Start: 2019-08-05 | End: 2019-08-10

## 2019-08-05 RX ORDER — DIPHENHYDRAMINE HCL 50 MG
25 CAPSULE ORAL EVERY 4 HOURS
Refills: 0 | Status: DISCONTINUED | OUTPATIENT
Start: 2019-08-05 | End: 2019-08-10

## 2019-08-05 RX ORDER — ONDANSETRON 8 MG/1
4 TABLET, FILM COATED ORAL ONCE
Refills: 0 | Status: DISCONTINUED | OUTPATIENT
Start: 2019-08-05 | End: 2019-08-05

## 2019-08-05 RX ORDER — HYDROMORPHONE HYDROCHLORIDE 2 MG/ML
0.5 INJECTION INTRAMUSCULAR; INTRAVENOUS; SUBCUTANEOUS
Refills: 0 | Status: DISCONTINUED | OUTPATIENT
Start: 2019-08-05 | End: 2019-08-05

## 2019-08-05 RX ORDER — VANCOMYCIN HCL 1 G
1000 VIAL (EA) INTRAVENOUS EVERY 12 HOURS
Refills: 0 | Status: DISCONTINUED | OUTPATIENT
Start: 2019-08-05 | End: 2019-08-07

## 2019-08-05 RX ORDER — ENOXAPARIN SODIUM 100 MG/ML
40 INJECTION SUBCUTANEOUS EVERY 24 HOURS
Refills: 0 | Status: DISCONTINUED | OUTPATIENT
Start: 2019-08-06 | End: 2019-08-10

## 2019-08-05 RX ORDER — ACETAMINOPHEN 500 MG
1000 TABLET ORAL ONCE
Refills: 0 | Status: COMPLETED | OUTPATIENT
Start: 2019-08-06 | End: 2019-08-06

## 2019-08-05 RX ORDER — BENZOCAINE AND MENTHOL 5; 1 G/100ML; G/100ML
1 LIQUID ORAL
Refills: 0 | Status: DISCONTINUED | OUTPATIENT
Start: 2019-08-05 | End: 2019-08-10

## 2019-08-05 RX ORDER — DOCUSATE SODIUM 100 MG
100 CAPSULE ORAL THREE TIMES A DAY
Refills: 0 | Status: DISCONTINUED | OUTPATIENT
Start: 2019-08-05 | End: 2019-08-10

## 2019-08-05 RX ORDER — ONDANSETRON 8 MG/1
4 TABLET, FILM COATED ORAL EVERY 6 HOURS
Refills: 0 | Status: DISCONTINUED | OUTPATIENT
Start: 2019-08-05 | End: 2019-08-10

## 2019-08-05 RX ORDER — LANOLIN ALCOHOL/MO/W.PET/CERES
3 CREAM (GRAM) TOPICAL AT BEDTIME
Refills: 0 | Status: DISCONTINUED | OUTPATIENT
Start: 2019-08-05 | End: 2019-08-10

## 2019-08-05 RX ORDER — HYDROMORPHONE HYDROCHLORIDE 2 MG/ML
1 INJECTION INTRAMUSCULAR; INTRAVENOUS; SUBCUTANEOUS
Refills: 0 | Status: DISCONTINUED | OUTPATIENT
Start: 2019-08-05 | End: 2019-08-06

## 2019-08-05 RX ORDER — FOLIC ACID 0.8 MG
1 TABLET ORAL DAILY
Refills: 0 | Status: DISCONTINUED | OUTPATIENT
Start: 2019-08-05 | End: 2019-08-10

## 2019-08-05 RX ORDER — ACETAMINOPHEN 500 MG
650 TABLET ORAL EVERY 6 HOURS
Refills: 0 | Status: DISCONTINUED | OUTPATIENT
Start: 2019-08-05 | End: 2019-08-10

## 2019-08-05 RX ORDER — CEFEPIME 1 G/1
2000 INJECTION, POWDER, FOR SOLUTION INTRAMUSCULAR; INTRAVENOUS EVERY 12 HOURS
Refills: 0 | Status: DISCONTINUED | OUTPATIENT
Start: 2019-08-05 | End: 2019-08-07

## 2019-08-05 RX ADMIN — ONDANSETRON 4 MILLIGRAM(S): 8 TABLET, FILM COATED ORAL at 22:50

## 2019-08-05 RX ADMIN — HYDROMORPHONE HYDROCHLORIDE 0.5 MILLIGRAM(S): 2 INJECTION INTRAMUSCULAR; INTRAVENOUS; SUBCUTANEOUS at 20:53

## 2019-08-05 RX ADMIN — HYDROMORPHONE HYDROCHLORIDE 1 MILLIGRAM(S): 2 INJECTION INTRAMUSCULAR; INTRAVENOUS; SUBCUTANEOUS at 23:15

## 2019-08-05 RX ADMIN — OXYCODONE HYDROCHLORIDE 10 MILLIGRAM(S): 5 TABLET ORAL at 22:43

## 2019-08-05 RX ADMIN — HYDROMORPHONE HYDROCHLORIDE 1 MILLIGRAM(S): 2 INJECTION INTRAMUSCULAR; INTRAVENOUS; SUBCUTANEOUS at 23:30

## 2019-08-05 RX ADMIN — HYDROMORPHONE HYDROCHLORIDE 0.5 MILLIGRAM(S): 2 INJECTION INTRAMUSCULAR; INTRAVENOUS; SUBCUTANEOUS at 21:06

## 2019-08-05 RX ADMIN — OXYCODONE HYDROCHLORIDE 10 MILLIGRAM(S): 5 TABLET ORAL at 23:14

## 2019-08-05 NOTE — CHART NOTE - NSCHARTNOTEFT_GEN_A_CORE
Patient seen in RR with wife at bedside. Resting with complaints of moderate pain to left ankle.  Denies Chest Pain, SOB, N/V.    T(C): 36.6 (08-05-19 @ 21:45), Max: 36.9 (08-05-19 @ 20:30)  HR: 84 (08-05-19 @ 21:45) (69 - 89)  BP: 156/84 (08-05-19 @ 21:45) (120/78 - 193/93)  RR: 18 (08-05-19 @ 21:45) (16 - 19)  SpO2: 99% (08-05-19 @ 21:45) (95% - 100%)  Wt(kg): --    Exam:  Alert and Blair, No Acute Distress  Card: +S1/S2, RRR  Pulm: CTAB  Laterality: L ankle soft dressings c/d/i  Toes warm, brisk capillary refill  SILT    Xray: Intra-op images in chart                          16.0   10.9  )-----------( 335      ( 05 Aug 2019 21:10 )             48.1    08-05    138  |  95<L>  |  11  ----------------------------<  162<H>  4.2   |  27  |  1.05    Ca    9.3      05 Aug 2019 21:10        A/P: Patient is a 49y Male S/p L Ankle LIAM, Excision of L tibia and sequestrectomy, L tibia and placement of Antibiotic beads. VSS. NAD  -PT/OT-WBAT With CAM Boot only  -Elevate Left leg  -IS encouraged  -DVT PPx- Lovenox 40 QD  -Pain Control PRN  -OOB to chair  -FU OR Cxs  -FU AM Labs      Evelyn Sims PA-C  Team Pager #[phone]

## 2019-08-05 NOTE — PROGRESS NOTE ADULT - SUBJECTIVE AND OBJECTIVE BOX
ient resting without complaints.  No fever, chills, chest pain, SOB, N/V.    Vital Signs Last 24 Hrs  T(C): 36.7 (05 Aug 2019 04:27), Max: 36.9 (04 Aug 2019 16:55)  T(F): 98.1 (05 Aug 2019 04:27), Max: 98.5 (04 Aug 2019 16:55)  HR: 76 (05 Aug 2019 04:27) (68 - 76)  BP: 149/86 (05 Aug 2019 04:27) (128/74 - 149/86)  BP(mean): --  RR: 18 (05 Aug 2019 04:27) (18 - 18)  SpO2: 98% (05 Aug 2019 04:27) (95% - 98%)      Exam:  GEN: NAD  Lower Extremities:  LLE: Scant amount of purulent drainage noted on lateral ankle dressing,   EHL/FHL intact  SILT GRossly  WWP distally  Calves Soft, Non-tender bilaterally                                 15.8   4.7   )-----------( 331      ( 05 Aug 2019 05:05 )             47.7                         14.9   4.26  )-----------( 315      ( 04 Aug 2019 08:25 )             42.5       08-05    137  |  97  |  14  ----------------------------<  96  4.2   |  27  |  0.89        PT/INR - ( 05 Aug 2019 05:05 )   PT: 11.4 sec;   INR: 0.99 ratio         PTT - ( 05 Aug 2019 05:05 )  PTT:37.3 sec    Imaging:  < from: CT Lower Extremity No Cont, Left (08.03.19 @ 20:51) >  IMPRESSION:   Status post ORIF of the tibia extending through the talus and calcaneus   as detailed above with early callus formation at the distal tibia and   fibular fractures without bony bridging or solid osseous healing.    Marked soft tissue swelling without CT evidence of a soft tissue abscess.    Although CT is limited for evaluation of osteomyelitis, there is no   obvious osteomyelitis identified.    < end of copied text >

## 2019-08-05 NOTE — PROGRESS NOTE ADULT - ATTENDING COMMENTS
Pt seen and examined.  Exam and plan as above Pt seen and examined.  Exam and plan as above. An extensive discussion was had w the patient regarding continued attempts at limb salvage vs BKA.  At this point in time the patient wishes to continue all attempts at limb salvage.  We will perform a LIAM and removal of infected bone.  R/B/A discussed

## 2019-08-05 NOTE — PROGRESS NOTE ADULT - SUBJECTIVE AND OBJECTIVE BOX
ORTHO ATTENDING POST OP    S/p   removal L hindfoot nail, excision L distal fibula and sequestrectomy tibia  cx  x6 sent  abx beads placed  WBAT  L LE in CAM boot only  Elevation  CBC in RR and AM  Ancef 1g x 24H  rehab consult  Lovenox 40qd  CBC in RR and AM  venodynes
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
## 2019-08-05 NOTE — BRIEF OPERATIVE NOTE - OPERATION/FINDINGS
left tibia osteomyelitis, infected hardware, removal of hardware, sequestrectomy, I&D, biopsy/culture  see op report
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
## 2019-08-06 ENCOUNTER — TRANSCRIPTION ENCOUNTER (OUTPATIENT)
Age: 49
End: 2019-08-06

## 2019-08-06 LAB
ANION GAP SERPL CALC-SCNC: 13 MMOL/L — SIGNIFICANT CHANGE UP (ref 5–17)
BUN SERPL-MCNC: 10 MG/DL — SIGNIFICANT CHANGE UP (ref 7–23)
CALCIUM SERPL-MCNC: 9.1 MG/DL — SIGNIFICANT CHANGE UP (ref 8.4–10.5)
CHLORIDE SERPL-SCNC: 90 MMOL/L — LOW (ref 96–108)
CO2 SERPL-SCNC: 27 MMOL/L — SIGNIFICANT CHANGE UP (ref 22–31)
CREAT SERPL-MCNC: 0.85 MG/DL — SIGNIFICANT CHANGE UP (ref 0.5–1.3)
GLUCOSE SERPL-MCNC: 144 MG/DL — HIGH (ref 70–99)
GRAM STN FLD: SIGNIFICANT CHANGE UP
HCT VFR BLD CALC: 41.8 % — SIGNIFICANT CHANGE UP (ref 39–50)
HGB BLD-MCNC: 14.3 G/DL — SIGNIFICANT CHANGE UP (ref 13–17)
MCHC RBC-ENTMCNC: 31.3 PG — SIGNIFICANT CHANGE UP (ref 27–34)
MCHC RBC-ENTMCNC: 34.2 GM/DL — SIGNIFICANT CHANGE UP (ref 32–36)
MCV RBC AUTO: 91.5 FL — SIGNIFICANT CHANGE UP (ref 80–100)
PLATELET # BLD AUTO: 308 K/UL — SIGNIFICANT CHANGE UP (ref 150–400)
POTASSIUM SERPL-MCNC: 4.1 MMOL/L — SIGNIFICANT CHANGE UP (ref 3.5–5.3)
POTASSIUM SERPL-SCNC: 4.1 MMOL/L — SIGNIFICANT CHANGE UP (ref 3.5–5.3)
RBC # BLD: 4.57 M/UL — SIGNIFICANT CHANGE UP (ref 4.2–5.8)
RBC # FLD: 11.6 % — SIGNIFICANT CHANGE UP (ref 10.3–14.5)
SODIUM SERPL-SCNC: 130 MMOL/L — LOW (ref 135–145)
SPECIMEN SOURCE: SIGNIFICANT CHANGE UP
WBC # BLD: 13.02 K/UL — HIGH (ref 3.8–10.5)
WBC # FLD AUTO: 13.02 K/UL — HIGH (ref 3.8–10.5)

## 2019-08-06 RX ORDER — TRAMADOL HYDROCHLORIDE 50 MG/1
50 TABLET ORAL EVERY 6 HOURS
Refills: 0 | Status: DISCONTINUED | OUTPATIENT
Start: 2019-08-06 | End: 2019-08-10

## 2019-08-06 RX ORDER — HYDROMORPHONE HYDROCHLORIDE 2 MG/ML
0.5 INJECTION INTRAMUSCULAR; INTRAVENOUS; SUBCUTANEOUS ONCE
Refills: 0 | Status: DISCONTINUED | OUTPATIENT
Start: 2019-08-06 | End: 2019-08-06

## 2019-08-06 RX ORDER — OXYCODONE HYDROCHLORIDE 5 MG/1
15 TABLET ORAL EVERY 4 HOURS
Refills: 0 | Status: DISCONTINUED | OUTPATIENT
Start: 2019-08-06 | End: 2019-08-10

## 2019-08-06 RX ORDER — OXYCODONE HYDROCHLORIDE 5 MG/1
10 TABLET ORAL EVERY 4 HOURS
Refills: 0 | Status: DISCONTINUED | OUTPATIENT
Start: 2019-08-06 | End: 2019-08-10

## 2019-08-06 RX ORDER — PANTOPRAZOLE SODIUM 20 MG/1
40 TABLET, DELAYED RELEASE ORAL
Refills: 0 | Status: DISCONTINUED | OUTPATIENT
Start: 2019-08-06 | End: 2019-08-10

## 2019-08-06 RX ORDER — POLYETHYLENE GLYCOL 3350 17 G/17G
17 POWDER, FOR SOLUTION ORAL DAILY
Refills: 0 | Status: DISCONTINUED | OUTPATIENT
Start: 2019-08-06 | End: 2019-08-10

## 2019-08-06 RX ORDER — MORPHINE SULFATE 50 MG/1
15 CAPSULE, EXTENDED RELEASE ORAL EVERY 12 HOURS
Refills: 0 | Status: DISCONTINUED | OUTPATIENT
Start: 2019-08-06 | End: 2019-08-10

## 2019-08-06 RX ORDER — METOCLOPRAMIDE HCL 10 MG
10 TABLET ORAL ONCE
Refills: 0 | Status: COMPLETED | OUTPATIENT
Start: 2019-08-06 | End: 2019-08-06

## 2019-08-06 RX ORDER — SENNA PLUS 8.6 MG/1
2 TABLET ORAL AT BEDTIME
Refills: 0 | Status: DISCONTINUED | OUTPATIENT
Start: 2019-08-06 | End: 2019-08-10

## 2019-08-06 RX ADMIN — TRAMADOL HYDROCHLORIDE 50 MILLIGRAM(S): 50 TABLET ORAL at 16:00

## 2019-08-06 RX ADMIN — MORPHINE SULFATE 15 MILLIGRAM(S): 50 CAPSULE, EXTENDED RELEASE ORAL at 12:30

## 2019-08-06 RX ADMIN — TRAMADOL HYDROCHLORIDE 50 MILLIGRAM(S): 50 TABLET ORAL at 21:24

## 2019-08-06 RX ADMIN — Medication 250 MILLIGRAM(S): at 05:01

## 2019-08-06 RX ADMIN — HYDROMORPHONE HYDROCHLORIDE 0.5 MILLIGRAM(S): 2 INJECTION INTRAMUSCULAR; INTRAVENOUS; SUBCUTANEOUS at 01:46

## 2019-08-06 RX ADMIN — SENNA PLUS 2 TABLET(S): 8.6 TABLET ORAL at 21:23

## 2019-08-06 RX ADMIN — OXYCODONE HYDROCHLORIDE 15 MILLIGRAM(S): 5 TABLET ORAL at 19:52

## 2019-08-06 RX ADMIN — MORPHINE SULFATE 15 MILLIGRAM(S): 50 CAPSULE, EXTENDED RELEASE ORAL at 11:06

## 2019-08-06 RX ADMIN — CEFEPIME 100 MILLIGRAM(S): 1 INJECTION, POWDER, FOR SOLUTION INTRAMUSCULAR; INTRAVENOUS at 18:28

## 2019-08-06 RX ADMIN — Medication 1000 MILLIGRAM(S): at 12:30

## 2019-08-06 RX ADMIN — OXYCODONE HYDROCHLORIDE 15 MILLIGRAM(S): 5 TABLET ORAL at 19:22

## 2019-08-06 RX ADMIN — Medication 400 MILLIGRAM(S): at 04:55

## 2019-08-06 RX ADMIN — ENOXAPARIN SODIUM 40 MILLIGRAM(S): 100 INJECTION SUBCUTANEOUS at 06:42

## 2019-08-06 RX ADMIN — CEFEPIME 100 MILLIGRAM(S): 1 INJECTION, POWDER, FOR SOLUTION INTRAMUSCULAR; INTRAVENOUS at 05:03

## 2019-08-06 RX ADMIN — Medication 500 MILLIGRAM(S): at 17:42

## 2019-08-06 RX ADMIN — OXYCODONE HYDROCHLORIDE 15 MILLIGRAM(S): 5 TABLET ORAL at 04:59

## 2019-08-06 RX ADMIN — OXYCODONE HYDROCHLORIDE 10 MILLIGRAM(S): 5 TABLET ORAL at 15:12

## 2019-08-06 RX ADMIN — Medication 3 MILLIGRAM(S): at 21:23

## 2019-08-06 RX ADMIN — Medication 400 MILLIGRAM(S): at 11:27

## 2019-08-06 RX ADMIN — TRAMADOL HYDROCHLORIDE 50 MILLIGRAM(S): 50 TABLET ORAL at 15:12

## 2019-08-06 RX ADMIN — Medication 250 MILLIGRAM(S): at 17:42

## 2019-08-06 RX ADMIN — Medication 1 MILLIGRAM(S): at 11:27

## 2019-08-06 RX ADMIN — Medication 100 MILLIGRAM(S): at 21:23

## 2019-08-06 RX ADMIN — OXYCODONE HYDROCHLORIDE 10 MILLIGRAM(S): 5 TABLET ORAL at 16:00

## 2019-08-06 RX ADMIN — Medication 500 MILLIGRAM(S): at 05:01

## 2019-08-06 RX ADMIN — Medication 1000 MILLIGRAM(S): at 05:40

## 2019-08-06 RX ADMIN — Medication 10 MILLIGRAM(S): at 00:23

## 2019-08-06 RX ADMIN — HYDROMORPHONE HYDROCHLORIDE 0.5 MILLIGRAM(S): 2 INJECTION INTRAMUSCULAR; INTRAVENOUS; SUBCUTANEOUS at 02:01

## 2019-08-06 RX ADMIN — OXYCODONE HYDROCHLORIDE 15 MILLIGRAM(S): 5 TABLET ORAL at 05:40

## 2019-08-06 RX ADMIN — Medication 100 MILLIGRAM(S): at 05:01

## 2019-08-06 RX ADMIN — Medication 1 TABLET(S): at 11:27

## 2019-08-06 RX ADMIN — Medication 25 MILLIGRAM(S): at 19:21

## 2019-08-06 NOTE — PHYSICAL THERAPY INITIAL EVALUATION ADULT - PRECAUTIONS/LIMITATIONS, REHAB EVAL
MRILFoot: Status post ORIF of a tibial fracture with intramedullary james that extends through the talus and calcaneus. Signal alteration in the tibia, talus, and calcaneus may be posttraumatic or related to postsurgical change, however, osteomyelitis cannot be excluded. Fluid-filled sinus tract at the level of the distal tibia extending from the lateral skin surface to the fractured lateral aspect of the tibia. CXR (-)

## 2019-08-06 NOTE — PROGRESS NOTE ADULT - SUBJECTIVE AND OBJECTIVE BOX
CC: f/u for  infected left ankle hardware  Patient reports    REVIEW OF SYSTEMS:  All other review of systems negative (Comprehensive ROS)    Antimicrobials Day #  :2  cefepime   IVPB 2000 milliGRAM(s) IV Intermittent every 12 hours  vancomycin  IVPB 1000 milliGRAM(s) IV Intermittent every 12 hours    Other Medications Reviewed    T(F): 98.2 (08-06-19 @ 13:15), Max: 98.6 (08-06-19 @ 08:29)  HR: 71 (08-06-19 @ 13:15)  BP: 110/64 (08-06-19 @ 13:15)  RR: 16 (08-06-19 @ 13:15)  SpO2: 96% (08-06-19 @ 13:15)  Wt(kg): --    PHYSICAL EXAM:  General: alert, no acute distress  Eyes:  anicteric, no conjunctival injection, no discharge  Oropharynx: no lesions or injection 	  Neck: supple, without adenopathy  Lungs: clear to auscultation  Heart: regular rate and rhythm; no murmur, rubs or gallops  Abdomen: soft, nondistended, nontender, without mass or organomegaly  Skin: no lesions  Extremities: no clubbing, cyanosis, or edema. left leg wrapped  Neurologic: alert, oriented, moves all extremities    LAB RESULTS:                        14.3   13.02 )-----------( 308      ( 06 Aug 2019 10:38 )             41.8     08-06    130<L>  |  90<L>  |  10  ----------------------------<  144<H>  4.1   |  27  |  0.85    Ca    9.1      06 Aug 2019 07:07          MICROBIOLOGY:  RECENT CULTURES:  08-06 @ 01:39 .Other     Testing in progress      08-06 @ 00:59 .Tissue       No polymorphonuclear cells seen  No organisms seen    08-06 @ 00:57 .Tissue     Testing in progress    No polymorphonuclear cells seen  No organisms seen    08-06 @ 00:55 .Tissue     Testing in progress    No polymorphonuclear cells seen  No organisms seen    08-06 @ 00:52 .Tissue     Testing in progress    No polymorphonuclear cells seen  No organisms seen    08-06 @ 00:51 .Other     Testing in progress      08-04 @ 01:02 .Blood     No growth to date.          RADIOLOGY REVIEWED:  < from: MR Tib/Fib w/wo IV Cont, Left (08.05.19 @ 08:42) >  IMPRESSION:  Status post ORIF of a tibial fracture with intramedullary james that   extends through the talus and calcaneus. Correlate with report from CT to   further evaluate the fracture and hardware. Signal alteration in the   tibia, talus,and calcaneus may be posttraumatic or related to   postsurgical change, however, osteomyelitis cannot be excluded.   Fluid-filled sinus tract at the level of the distal tibia extending from   the lateral skin surface to the fractured lateral aspect of the tibia        < end of copied text >        Assessment:  Patient with open fracture to left ankle, large amount of necrotic skin, had ext fix then fusion with flap. returns with draining sinus lateral ankle now s/p gera  Plan:  continue vanco and cefepime   await operative cultures  anticipate will need 6 weeks iv antibiotics

## 2019-08-06 NOTE — DISCHARGE NOTE PROVIDER - CARE PROVIDERS DIRECT ADDRESSES
[email or web address]

## 2019-08-06 NOTE — PROGRESS NOTE ADULT - SUBJECTIVE AND OBJECTIVE BOX
Pt seen and examined.   S/p I&D, removal of hardware, and antibiotic bead placement.   Had pain overnight, improved with oxycodone. Currently well controlled. Leg elevated. Has not yet been OOB.     Vital Signs Last 24 Hrs  T(C): 36.7 (06 Aug 2019 05:08), Max: 36.9 (05 Aug 2019 20:30)  T(F): 98.1 (06 Aug 2019 05:08), Max: 98.5 (06 Aug 2019 01:30)  HR: 67 (06 Aug 2019 05:08) (67 - 89)  BP: 129/72 (06 Aug 2019 05:08) (120/78 - 193/93)  BP(mean): 115 (05 Aug 2019 21:45) (105 - 133)  RR: 18 (06 Aug 2019 05:08) (16 - 19)  SpO2: 95% (06 Aug 2019 05:08) (94% - 100%)    PE:  NAD  LLE in bulky dressing, C/D/I  EHL/FHL/EDL/FDL grossly intact  Unable to assess ankle ROM 2/2 arthrodesis  Diminished sensation in the superficial peroneal distribution, otherwise SILT  WWP brisk cap refill                          16.0   10.9  )-----------( 335      ( 05 Aug 2019 21:10 )             48.1     08-05    138  |  95<L>  |  11  ----------------------------<  162<H>  4.2   |  27  |  1.05    Ca    9.3      05 Aug 2019 21:10
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
## 2019-08-06 NOTE — DISCHARGE NOTE PROVIDER - CARE PROVIDER_API CALL
Cole Diaz (MD)  Orthopaedic Surgery  [address]  [place] [address]  Phone: [phone]  Fax: [phone]  Follow Up Time: Cole Diaz)  Orthopaedic Surgery  [address], [address]  [place] [address]  Phone: [phone]  Fax: [phone]  Follow Up Time:     Addison Salvador)  Internal Medicine  [address], [address]  [place] [address]  Phone: [phone]  Fax: [phone]  Follow Up Time:

## 2019-08-06 NOTE — PHYSICAL THERAPY INITIAL EVALUATION ADULT - BALANCE TRAINING, PT EVAL
GOAL: Pt will maintain dynamic standing balance for 10 mins to facilitate hygiene activities in 1 week.

## 2019-08-06 NOTE — PHYSICAL THERAPY INITIAL EVALUATION ADULT - RANGE OF MOTION EXAMINATION, REHAB EVAL
bilateral upper extremity ROM was WFL (within functional limits)/Right LE ROM was WFL (within functional limits)/L knee/hip WFL; L ankle limited 2/2 pain

## 2019-08-06 NOTE — DISCHARGE NOTE PROVIDER - NSDCCPCAREPLAN_GEN_ALL_CORE_FT
PRINCIPAL DISCHARGE DIAGNOSIS  Diagnosis: Infected abrasion of left ankle, initial encounter  Assessment and Plan of Treatment:

## 2019-08-06 NOTE — PROGRESS NOTE ADULT - ASSESSMENT
48 yo male with a hx of an ankle fx. Presents with a hardware failure seen s/p removal of hardware. 50 yo male with a hx of an ankle fx. Presents  seen s/p removal of hardware.  The patient had  removal of hardware 08/05/19 and is on IV Cefapime and Vancomycin for osteomyelitis, pending O.R. cultures.

## 2019-08-06 NOTE — PHYSICAL THERAPY INITIAL EVALUATION ADULT - PERTINENT HX OF CURRENT PROBLEM, REHAB EVAL
49yMhx of G3B L pilon fx sp exfix (10/8/'18, OSH), I&D, rev exfix (10/28/'18), ankle fusion & ORIF (11/1/'18), soleus flap (11/2/'18) p/w purulent drainage lateral ankle for a few days and increase left ankle pain for approximately 2 weeks. Denies f/c. No other complaints. Was told to come to ED for admission by Dr. Diaz's office.

## 2019-08-06 NOTE — PROGRESS NOTE ADULT - ASSESSMENT
49yMale with a grade 3 open pilon s/p ex-fix followed by multiple revision surgeries including flap by PRS, who now comes in for wound drainage s/p I&D, removal of hardware, sequestrectomy, and placement of antibiotic beads   - WBAT LLE in CAM boot, family to bring in today  - PT/OOB  - Elevate  - IS  - F/U OR cultures  - F/u ID recs, on vanc/cefepime  - Lovenox for DVT ppx  - Pain control  - Dispo planning

## 2019-08-06 NOTE — DISCHARGE NOTE PROVIDER - NSDCACTIVITY_GEN_ALL_CORE
Do not make important decisions/No heavy lifting/straining/Do not drive or operate machinery/Walking - Indoors allowed/Walking - Outdoors allowed/Stairs allowed

## 2019-08-06 NOTE — PROGRESS NOTE ADULT - ATTENDING COMMENTS
I am a non participating [employer] physician seeing Pt in coverage for Dr Cuevas No medical complications post-op to date and to proceed with physical therapy, as tolerated. Continues pulmonary toilet to lessen atelectasis, leg exercises as taught to lessen the risk of DVT and supervised pain medications for post-op pain control.  The patient had  removal of hardware 08/05/19 and is on IV Cefapime and Vancomycin for osteomyelitis, pending O.R. cultures.

## 2019-08-06 NOTE — DISCHARGE NOTE PROVIDER - NSDCFUADDINST_GEN_ALL_CORE_FT
Please follow up with Dr. Diaz 7-10 days after your discharge from the hospital (call for appointment).  PT-weight bearing as tolerated.  Aspirin 325 twice daily x 6 weeks total for dvt prevention.  Keep dressing clean and intact, have doctor remove staples/sutures post op day 14 (if applicable) and apply steristrips.  Please follow up with your PMD within 1 month for routine checkup.  Please follow up with Infectious Disease within 2 weeks of discharge (call for appointment, info above).  Will require weekly cbc w/ diff, CMP/ESR/CRP results faxed to Infectious disease.

## 2019-08-06 NOTE — PHYSICAL THERAPY INITIAL EVALUATION ADULT - ADDITIONAL COMMENTS
PTA pt was using a single point cane to amb. PTA pt was using a single point cane to amb. Pt owns axillary crutches, single point cane, w/c, shwr chair. Pt was ind /c ADLs. Pt lives /c wife in first floor apartment /c 4 PRETTY /c B/L HR.

## 2019-08-06 NOTE — DISCHARGE NOTE PROVIDER - HOSPITAL COURSE
History of Present Illness:     Orthopaedic Surgery Consult Note        Chief Complaint:        HPI:    49yMale hx of G3B L pilon fx sp exfix (10/8/'18, OSH), I&D, rev exfix (10/28/'18), ankle fusion & ORIF (11/1/'18), soleus flap (11/2/'18) presenting with purulent drainage lateral ankle for a few days and increase left ankle pain for approximately 2 weeks. Denies f/c. No other complaints. Was told to come to ED for admission by Dr. Diaz's office        ROS: As documented in HPI, otherwise negative.        PAST MEDICAL & SURGICAL HISTORY:    No pertinent past medical history    No significant past surgical history            Hospital Course:    Patient presented to [facility] for increasing pain of left ankle with concern for infection. Medicine Team consulted and cleared patient for surgery. Infectious Disease Team consulted and recommendations made. On 8/5/19 underwent Incision and drainage/Removal of Hardware/Antibiotic bead placement without complications.     Evaluated and treated by Physical Therapy whom recommended History of Present Illness:     Orthopaedic Surgery Consult Note        Chief Complaint:        HPI:    49yMale hx of G3B L pilon fx sp exfix (10/8/'18, OSH), I&D, rev exfix (10/28/'18), ankle fusion & ORIF (11/1/'18), soleus flap (11/2/'18) presenting with purulent drainage lateral ankle for a few days and increase left ankle pain for approximately 2 weeks. Denies f/c. No other complaints. Was told to come to ED for admission by Dr. Diaz's office        ROS: As documented in HPI, otherwise negative.        PAST MEDICAL & SURGICAL HISTORY:    No pertinent past medical history    No significant past surgical history            Hospital Course:    Patient presented to [facility] for increasing pain of left ankle with concern for infection. Medicine Team consulted and cleared patient for surgery. Infectious Disease Team consulted and recommendations made. On 8/5/19 underwent Incision and drainage/Removal of Hardware/Antibiotic bead placement without complications.     Evaluated and treated by Physical Therapy whom recommended home with outpatient physical therapy for discharge.         08/08/19: PICC line was placed for continued home infusions of antibiotics without complications. Infectious disease team recommending 6 weeks of IV Cefazolin and PO Flagyl.         Remainder of hospital stay unremarkable. Patient stable and ready for discharge when Home IV Infusion care set up. History of Present Illness:     Orthopaedic Surgery Consult Note        Chief Complaint:        HPI:    49yMale hx of G3B L pilon fx sp exfix (10/8/'18, OSH), I&D, rev exfix (10/28/'18), ankle fusion & ORIF (11/1/'18), soleus flap (11/2/'18) presenting with purulent drainage lateral ankle for a few days and increase left ankle pain for approximately 2 weeks. Denies f/c. No other complaints. Was told to come to ED for admission by Dr. Diaz's office        ROS: As documented in HPI, otherwise negative.        PAST MEDICAL & SURGICAL HISTORY:    No pertinent past medical history    No significant past surgical history            Hospital Course:    Patient presented to [facility] for increasing pain of left ankle with concern for infection. Medicine Team consulted and cleared patient for surgery. Infectious Disease Team consulted and recommendations made. On 8/5/19 underwent Incision and drainage/Removal of Hardware/Antibiotic bead placement without complications.     Evaluated and treated by Physical Therapy whom recommended home with outpatient physical therapy for discharge.         08/08/19: PICC line was placed for continued home infusions of antibiotics without complications. Infectious disease team recommending 6 weeks total of IV Cefazolin and PO Flagyl.         Remainder of hospital stay unremarkable. Patient stable and ready for discharge after 6AM dose of Ancef on 8/9/19.

## 2019-08-07 LAB
-  AMPICILLIN/SULBACTAM: SIGNIFICANT CHANGE UP
-  CEFAZOLIN: SIGNIFICANT CHANGE UP
-  CLINDAMYCIN: SIGNIFICANT CHANGE UP
-  ERYTHROMYCIN: SIGNIFICANT CHANGE UP
-  GENTAMICIN: SIGNIFICANT CHANGE UP
-  OXACILLIN: SIGNIFICANT CHANGE UP
-  PENICILLIN: SIGNIFICANT CHANGE UP
-  RIFAMPIN: SIGNIFICANT CHANGE UP
-  TETRACYCLINE: SIGNIFICANT CHANGE UP
-  TRIMETHOPRIM/SULFAMETHOXAZOLE: SIGNIFICANT CHANGE UP
-  VANCOMYCIN: SIGNIFICANT CHANGE UP
ANION GAP SERPL CALC-SCNC: 10 MMOL/L — SIGNIFICANT CHANGE UP (ref 5–17)
BUN SERPL-MCNC: 8 MG/DL — SIGNIFICANT CHANGE UP (ref 7–23)
CALCIUM SERPL-MCNC: 9.9 MG/DL — SIGNIFICANT CHANGE UP (ref 8.4–10.5)
CHLORIDE SERPL-SCNC: 96 MMOL/L — SIGNIFICANT CHANGE UP (ref 96–108)
CO2 SERPL-SCNC: 30 MMOL/L — SIGNIFICANT CHANGE UP (ref 22–31)
CREAT SERPL-MCNC: 0.98 MG/DL — SIGNIFICANT CHANGE UP (ref 0.5–1.3)
CULTURE RESULTS: SIGNIFICANT CHANGE UP
GLUCOSE SERPL-MCNC: 112 MG/DL — HIGH (ref 70–99)
HCT VFR BLD CALC: 42 % — SIGNIFICANT CHANGE UP (ref 39–50)
HGB BLD-MCNC: 14.3 G/DL — SIGNIFICANT CHANGE UP (ref 13–17)
MCHC RBC-ENTMCNC: 31.8 PG — SIGNIFICANT CHANGE UP (ref 27–34)
MCHC RBC-ENTMCNC: 34.1 GM/DL — SIGNIFICANT CHANGE UP (ref 32–36)
MCV RBC AUTO: 93.4 FL — SIGNIFICANT CHANGE UP (ref 80–100)
METHOD TYPE: SIGNIFICANT CHANGE UP
ORGANISM # SPEC MICROSCOPIC CNT: SIGNIFICANT CHANGE UP
ORGANISM # SPEC MICROSCOPIC CNT: SIGNIFICANT CHANGE UP
PLATELET # BLD AUTO: 300 K/UL — SIGNIFICANT CHANGE UP (ref 150–400)
POTASSIUM SERPL-MCNC: 4 MMOL/L — SIGNIFICANT CHANGE UP (ref 3.5–5.3)
POTASSIUM SERPL-SCNC: 4 MMOL/L — SIGNIFICANT CHANGE UP (ref 3.5–5.3)
RBC # BLD: 4.5 M/UL — SIGNIFICANT CHANGE UP (ref 4.2–5.8)
RBC # FLD: 11.2 % — SIGNIFICANT CHANGE UP (ref 10.3–14.5)
SODIUM SERPL-SCNC: 136 MMOL/L — SIGNIFICANT CHANGE UP (ref 135–145)
SPECIMEN SOURCE: SIGNIFICANT CHANGE UP
VANCOMYCIN TROUGH SERPL-MCNC: 4.8 UG/ML — LOW (ref 10–20)
WBC # BLD: 9 K/UL — SIGNIFICANT CHANGE UP (ref 3.8–10.5)
WBC # FLD AUTO: 9 K/UL — SIGNIFICANT CHANGE UP (ref 3.8–10.5)

## 2019-08-07 PROCEDURE — 99024 POSTOP FOLLOW-UP VISIT: CPT

## 2019-08-07 RX ORDER — VANCOMYCIN HCL 1 G
1250 VIAL (EA) INTRAVENOUS EVERY 12 HOURS
Refills: 0 | Status: DISCONTINUED | OUTPATIENT
Start: 2019-08-07 | End: 2019-08-08

## 2019-08-07 RX ADMIN — TRAMADOL HYDROCHLORIDE 50 MILLIGRAM(S): 50 TABLET ORAL at 07:10

## 2019-08-07 RX ADMIN — Medication 1 TABLET(S): at 11:50

## 2019-08-07 RX ADMIN — Medication 250 MILLIGRAM(S): at 17:44

## 2019-08-07 RX ADMIN — PANTOPRAZOLE SODIUM 40 MILLIGRAM(S): 20 TABLET, DELAYED RELEASE ORAL at 06:37

## 2019-08-07 RX ADMIN — CEFEPIME 100 MILLIGRAM(S): 1 INJECTION, POWDER, FOR SOLUTION INTRAMUSCULAR; INTRAVENOUS at 06:37

## 2019-08-07 RX ADMIN — OXYCODONE HYDROCHLORIDE 15 MILLIGRAM(S): 5 TABLET ORAL at 00:08

## 2019-08-07 RX ADMIN — SENNA PLUS 2 TABLET(S): 8.6 TABLET ORAL at 22:25

## 2019-08-07 RX ADMIN — CEFEPIME 100 MILLIGRAM(S): 1 INJECTION, POWDER, FOR SOLUTION INTRAMUSCULAR; INTRAVENOUS at 17:30

## 2019-08-07 RX ADMIN — Medication 100 MILLIGRAM(S): at 22:25

## 2019-08-07 RX ADMIN — MORPHINE SULFATE 15 MILLIGRAM(S): 50 CAPSULE, EXTENDED RELEASE ORAL at 13:00

## 2019-08-07 RX ADMIN — OXYCODONE HYDROCHLORIDE 15 MILLIGRAM(S): 5 TABLET ORAL at 00:38

## 2019-08-07 RX ADMIN — Medication 100 MILLIGRAM(S): at 06:37

## 2019-08-07 RX ADMIN — Medication 250 MILLIGRAM(S): at 06:37

## 2019-08-07 RX ADMIN — TRAMADOL HYDROCHLORIDE 50 MILLIGRAM(S): 50 TABLET ORAL at 00:54

## 2019-08-07 RX ADMIN — TRAMADOL HYDROCHLORIDE 50 MILLIGRAM(S): 50 TABLET ORAL at 06:36

## 2019-08-07 RX ADMIN — MORPHINE SULFATE 15 MILLIGRAM(S): 50 CAPSULE, EXTENDED RELEASE ORAL at 00:35

## 2019-08-07 RX ADMIN — MORPHINE SULFATE 15 MILLIGRAM(S): 50 CAPSULE, EXTENDED RELEASE ORAL at 23:00

## 2019-08-07 RX ADMIN — MORPHINE SULFATE 15 MILLIGRAM(S): 50 CAPSULE, EXTENDED RELEASE ORAL at 22:25

## 2019-08-07 RX ADMIN — MORPHINE SULFATE 15 MILLIGRAM(S): 50 CAPSULE, EXTENDED RELEASE ORAL at 00:07

## 2019-08-07 RX ADMIN — TRAMADOL HYDROCHLORIDE 50 MILLIGRAM(S): 50 TABLET ORAL at 23:33

## 2019-08-07 RX ADMIN — Medication 1 MILLIGRAM(S): at 11:50

## 2019-08-07 RX ADMIN — TRAMADOL HYDROCHLORIDE 50 MILLIGRAM(S): 50 TABLET ORAL at 13:00

## 2019-08-07 RX ADMIN — Medication 500 MILLIGRAM(S): at 06:37

## 2019-08-07 RX ADMIN — MORPHINE SULFATE 15 MILLIGRAM(S): 50 CAPSULE, EXTENDED RELEASE ORAL at 11:41

## 2019-08-07 RX ADMIN — TRAMADOL HYDROCHLORIDE 50 MILLIGRAM(S): 50 TABLET ORAL at 17:35

## 2019-08-07 RX ADMIN — Medication 500 MILLIGRAM(S): at 17:31

## 2019-08-07 RX ADMIN — ENOXAPARIN SODIUM 40 MILLIGRAM(S): 100 INJECTION SUBCUTANEOUS at 06:38

## 2019-08-07 RX ADMIN — TRAMADOL HYDROCHLORIDE 50 MILLIGRAM(S): 50 TABLET ORAL at 11:49
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
## 2019-08-07 NOTE — PROGRESS NOTE ADULT - ASSESSMENT
50 yo male with a hx of an ankle fx. Presents  seen s/p removal of hardware.  The patient had  removal of hardware 08/05/19 and is on IV Cefapime and Vancomycin for osteomyelitis, pending O.R. cultures.

## 2019-08-07 NOTE — OCCUPATIONAL THERAPY INITIAL EVALUATION ADULT - ADL RETRAINING, OT EVAL
GOAL: Patient will perform grooming standing sink level independently in 4 weeks GOAL: Pt will perform LB dressing independently in 4 weeks

## 2019-08-07 NOTE — OCCUPATIONAL THERAPY INITIAL EVALUATION ADULT - ANTICIPATED DISCHARGE DISPOSITION, OT EVAL
Daily Progress Note Custom Orthotics Pickup    [facility]  [address] [place] [address]  Phone - [phone] Fax - [phone]     Patient Name: Erin Pressley  Visit number: 2  Visits authorized: 2    Subjective:  Patient returns for custom orthotics.      Objective:  Orthotics were fit into athletic shoes.  Good fit was achieved.  Gait appears neutral with orthotics inserted.  Patient was instructed in wearing schedule and care and handout was issued.      Assessment:  Pt response to orthotics: Good fit and feel.    Plan:  Adjust if needed, otherwise D/C from PT.      Session length: 15     no skilled OT needs

## 2019-08-07 NOTE — PROGRESS NOTE ADULT - ATTENDING COMMENTS
No medical complications post-op to date and to proceed with physical therapy, as tolerated. Continues pulmonary toilet to lessen atelectasis, leg exercises as taught to lessen the risk of DVT and supervised pain medications for post-op pain control.  The patient had  removal of hardware 08/05/19 and is on IV  Vancomycin for osteomyelitis, pending O.R. cultures. No medical complications post-op to date and to proceed with physical therapy, as tolerated. Continues pulmonary toilet to lessen atelectasis, leg exercises as taught to lessen the risk of DVT and supervised pain medications for post-op pain control.  The patient had  removal of hardware 08/05/19 and is on IV  Vancomycin for osteomyelitis, pending O.R. cultures.   I am a non participating [facility] physician seeing Pt in coverage for Dr Cuevas No medical complications post-op to date and to proceed with physical therapy, as tolerated. Continues pulmonary toilet to lessen atelectasis, leg exercises as taught to lessen the risk of DVT and supervised pain medications for post-op pain control.  The patient had  removal of hardware 08/05/19 and is on IV  Vancomycin for osteomyelitis, pending O.R. cultures.   I am a non participating [facility] physician seeing Pt in coverage for Dr Cuevas. The above patient examination was reviewed with Dr. Mesa and I agree with his evaluation, assessment and treatment plan.  Jonas Cuevas M.D.

## 2019-08-07 NOTE — OCCUPATIONAL THERAPY INITIAL EVALUATION ADULT - DIAGNOSIS, OT EVAL
Pt presents with decreased ROM, strength, endurance, balance, and coordination, all impacting ability to perform ADLs and functional mobility.
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
## 2019-08-07 NOTE — PROGRESS NOTE ADULT - ASSESSMENT
A/P: 49 y/o M POD#2 s/p L Ankle LIAM/Abx beads      DVT ppx- Lovenox  WBAT in Cam Boot  Continue current pain management regimen with MS Contin, Oxy 10 and 15 mg  Reg diet  GI ppx  IV abx as per ID - Solo Little[place]epime        RUSSEL Hull  Orthopedic Surgery  [phone]

## 2019-08-07 NOTE — PROGRESS NOTE ADULT - SUBJECTIVE AND OBJECTIVE BOX
Patient resting without complaints. Reports pain better controlled today compared to yesterday.  Pain level currently 5/10.   Did not ambulate yesterday, was OOB to chair for 3 hours. Cam boot from home brought in late in day yesterday.   No chest pain, SOB, N/V, fever ,chills, abdominal pain. Reports eating "everything I can."    T(C): 37.6 (08-07-19 @ 05:12), Max: 37.6 (08-07-19 @ 05:12)  HR: 84 (08-07-19 @ 05:12) (71 - 84)  BP: 147/77 (08-07-19 @ 05:12) (110/64 - 147/77)  RR: 16 (08-07-19 @ 05:12) (16 - 16)  SpO2: 93% (08-07-19 @ 05:12) (93% - 98%)      Exam:  Alert and Oriented, No Acute Distress  Lower Extremities:  LLE: ACE bandage/splint in place C/D/I, Toes warm and mobile with brisk cap refill, dull sensation   grossly intact, DP2+  Calves Soft, Non-tender bilaterally                            14.3   13.02 )-----------( 308      ( 06 Aug 2019 10:38 )             41.8    08-06    130<L>  |  90<L>  |  10  ----------------------------<  144<H>  4.1   |  27  |  0.85    Ca    9.1      06 Aug 2019 07:07

## 2019-08-08 LAB
-  AMPICILLIN/SULBACTAM: SIGNIFICANT CHANGE UP
-  CEFAZOLIN: SIGNIFICANT CHANGE UP
-  CLINDAMYCIN: SIGNIFICANT CHANGE UP
-  ERYTHROMYCIN: SIGNIFICANT CHANGE UP
-  GENTAMICIN: SIGNIFICANT CHANGE UP
-  OXACILLIN: SIGNIFICANT CHANGE UP
-  PENICILLIN: SIGNIFICANT CHANGE UP
-  RIFAMPIN: SIGNIFICANT CHANGE UP
-  TETRACYCLINE: SIGNIFICANT CHANGE UP
-  TRIMETHOPRIM/SULFAMETHOXAZOLE: SIGNIFICANT CHANGE UP
-  VANCOMYCIN: SIGNIFICANT CHANGE UP
ANION GAP SERPL CALC-SCNC: 12 MMOL/L — SIGNIFICANT CHANGE UP (ref 5–17)
BUN SERPL-MCNC: 8 MG/DL — SIGNIFICANT CHANGE UP (ref 7–23)
CALCIUM SERPL-MCNC: 10.4 MG/DL — SIGNIFICANT CHANGE UP (ref 8.4–10.5)
CHLORIDE SERPL-SCNC: 92 MMOL/L — LOW (ref 96–108)
CO2 SERPL-SCNC: 31 MMOL/L — SIGNIFICANT CHANGE UP (ref 22–31)
CREAT SERPL-MCNC: 1.03 MG/DL — SIGNIFICANT CHANGE UP (ref 0.5–1.3)
CULTURE RESULTS: SIGNIFICANT CHANGE UP
GLUCOSE SERPL-MCNC: 111 MG/DL — HIGH (ref 70–99)
METHOD TYPE: SIGNIFICANT CHANGE UP
ORGANISM # SPEC MICROSCOPIC CNT: SIGNIFICANT CHANGE UP
ORGANISM # SPEC MICROSCOPIC CNT: SIGNIFICANT CHANGE UP
POTASSIUM SERPL-MCNC: 3.8 MMOL/L — SIGNIFICANT CHANGE UP (ref 3.5–5.3)
POTASSIUM SERPL-SCNC: 3.8 MMOL/L — SIGNIFICANT CHANGE UP (ref 3.5–5.3)
SODIUM SERPL-SCNC: 135 MMOL/L — SIGNIFICANT CHANGE UP (ref 135–145)
SPECIMEN SOURCE: SIGNIFICANT CHANGE UP

## 2019-08-08 PROCEDURE — 71045 X-RAY EXAM CHEST 1 VIEW: CPT | Mod: 26

## 2019-08-08 RX ORDER — METRONIDAZOLE 500 MG
500 TABLET ORAL EVERY 8 HOURS
Refills: 0 | Status: DISCONTINUED | OUTPATIENT
Start: 2019-08-08 | End: 2019-08-10

## 2019-08-08 RX ORDER — SODIUM CHLORIDE 9 MG/ML
10 INJECTION INTRAMUSCULAR; INTRAVENOUS; SUBCUTANEOUS
Refills: 0 | Status: DISCONTINUED | OUTPATIENT
Start: 2019-08-08 | End: 2019-08-10

## 2019-08-08 RX ORDER — CEFAZOLIN SODIUM 1 G
2000 VIAL (EA) INJECTION EVERY 8 HOURS
Refills: 0 | Status: DISCONTINUED | OUTPATIENT
Start: 2019-08-08 | End: 2019-08-10

## 2019-08-08 RX ORDER — CHLORHEXIDINE GLUCONATE 213 G/1000ML
1 SOLUTION TOPICAL DAILY
Refills: 0 | Status: DISCONTINUED | OUTPATIENT
Start: 2019-08-08 | End: 2019-08-10

## 2019-08-08 RX ORDER — CHLORHEXIDINE GLUCONATE 213 G/1000ML
1 SOLUTION TOPICAL
Refills: 0 | Status: DISCONTINUED | OUTPATIENT
Start: 2019-08-08 | End: 2019-08-08

## 2019-08-08 RX ADMIN — Medication 500 MILLIGRAM(S): at 23:09

## 2019-08-08 RX ADMIN — TRAMADOL HYDROCHLORIDE 50 MILLIGRAM(S): 50 TABLET ORAL at 05:36

## 2019-08-08 RX ADMIN — PANTOPRAZOLE SODIUM 40 MILLIGRAM(S): 20 TABLET, DELAYED RELEASE ORAL at 05:36

## 2019-08-08 RX ADMIN — TRAMADOL HYDROCHLORIDE 50 MILLIGRAM(S): 50 TABLET ORAL at 06:00

## 2019-08-08 RX ADMIN — TRAMADOL HYDROCHLORIDE 50 MILLIGRAM(S): 50 TABLET ORAL at 00:00

## 2019-08-08 RX ADMIN — Medication 1 MILLIGRAM(S): at 13:09

## 2019-08-08 RX ADMIN — MORPHINE SULFATE 15 MILLIGRAM(S): 50 CAPSULE, EXTENDED RELEASE ORAL at 22:00

## 2019-08-08 RX ADMIN — Medication 100 MILLIGRAM(S): at 05:36

## 2019-08-08 RX ADMIN — TRAMADOL HYDROCHLORIDE 50 MILLIGRAM(S): 50 TABLET ORAL at 23:02

## 2019-08-08 RX ADMIN — TRAMADOL HYDROCHLORIDE 50 MILLIGRAM(S): 50 TABLET ORAL at 14:09

## 2019-08-08 RX ADMIN — Medication 1 TABLET(S): at 13:10

## 2019-08-08 RX ADMIN — MORPHINE SULFATE 15 MILLIGRAM(S): 50 CAPSULE, EXTENDED RELEASE ORAL at 22:30

## 2019-08-08 RX ADMIN — Medication 100 MILLIGRAM(S): at 21:45

## 2019-08-08 RX ADMIN — MORPHINE SULFATE 15 MILLIGRAM(S): 50 CAPSULE, EXTENDED RELEASE ORAL at 14:09

## 2019-08-08 RX ADMIN — Medication 500 MILLIGRAM(S): at 05:36

## 2019-08-08 RX ADMIN — Medication 100 MILLIGRAM(S): at 13:09

## 2019-08-08 RX ADMIN — Medication 100 MILLIGRAM(S): at 21:46

## 2019-08-08 RX ADMIN — ENOXAPARIN SODIUM 40 MILLIGRAM(S): 100 INJECTION SUBCUTANEOUS at 05:38

## 2019-08-08 RX ADMIN — Medication 166.67 MILLIGRAM(S): at 05:42

## 2019-08-08 RX ADMIN — MORPHINE SULFATE 15 MILLIGRAM(S): 50 CAPSULE, EXTENDED RELEASE ORAL at 13:09

## 2019-08-08 RX ADMIN — SENNA PLUS 2 TABLET(S): 8.6 TABLET ORAL at 21:45

## 2019-08-08 RX ADMIN — TRAMADOL HYDROCHLORIDE 50 MILLIGRAM(S): 50 TABLET ORAL at 23:30

## 2019-08-08 RX ADMIN — TRAMADOL HYDROCHLORIDE 50 MILLIGRAM(S): 50 TABLET ORAL at 13:09

## 2019-08-08 NOTE — PROVIDER CONTACT NOTE (OTHER) - ACTION/TREATMENT ORDERED:
Alejandra GOODE aware. Pending PA bedside assessment. Will cont to monitor.
PA Donisop aware increased vanco to 1250mg in am will continue to monitor.

## 2019-08-08 NOTE — PROVIDER CONTACT NOTE (CRITICAL VALUE NOTIFICATION) - ACTION/TREATMENT ORDERED:
MD notified, Brian romero continue vanco, ID following pt no interventions needed. all needs attended will continue to monitor.

## 2019-08-08 NOTE — PROGRESS NOTE ADULT - ATTENDING COMMENTS
No medical complications post-op to date and to proceed with physical therapy, as tolerated. Continues pulmonary toilet to lessen atelectasis, leg exercises as taught to lessen the risk of DVT and supervised pain medications for post-op pain control.  The patient had  removal of hardware 08/05/19 and is on IV  Vancomycin for osteomyelitis. DC planning home with long course of abx  I am a non participating [facility] physician seeing Pt in coverage for Dr Cuevas. The above patient examination was reviewed with Dr. Mesa and I agree with his evaluation, assessment and treatment plan.  Jonas Cuevas M.D.

## 2019-08-08 NOTE — PROVIDER CONTACT NOTE (OTHER) - ASSESSMENT
Call puckett answered, pt diaphoretic, pale, c/o dizziness/lightheadness sitting at EOB. Pt checked on 10 minutes earlier with no s/s and asymptomatic. Pt placed flat in bed, vitals checked. /78, HR 71, temp 36.6C orally, O2 sats WNL.
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
## 2019-08-08 NOTE — PROVIDER CONTACT NOTE (OTHER) - BACKGROUND
Pt admitted s/p LLE infection, currently on IV abx. Receiving MS contin PO and 15mg oxycodone PRN for pain mgmt.

## 2019-08-08 NOTE — PROGRESS NOTE ADULT - SUBJECTIVE AND OBJECTIVE BOX
PROGRESS NOTE. POD #3    SUBJECTIVE: Patient is seen in bed resting comfortably and without any new complaints overnight. Patient states that he is feeling improved since yesterday noting that he has begun to regain sensation to the digits of his left foot. He says that he was also able to walk around the floor with physical therapy yesterday without complication.     OBJECTIVE:  Vital Signs Last 24 Hrs  T(C): 37.2 (08 Aug 2019 05:39), Max: 37.5 (08 Aug 2019 00:15)  T(F): 98.9 (08 Aug 2019 05:39), Max: 99.5 (08 Aug 2019 00:15)  HR: 94 (08 Aug 2019 05:39) (62 - 96)  BP: 122/73 (08 Aug 2019 05:39) (116/71 - 136/83)  BP(mean): --  RR: 18 (08 Aug 2019 05:39) (16 - 18)  SpO2: 98% (08 Aug 2019 05:39) (94% - 98%)    #BMP PENDING    PHYSICAL EXAM:  GENERAL: Well Appearing, No acute Distress.  EXT: LLE  #ACE Bandage in place. C/D/I  #Calfs are soft, warm, nontender.  #Dull Sensation grossly intact to touch.   #Strong Muscle Strength  #2+Dorsalis Pedis Pulse    Assessment:    49 Y/O male admitted with infected left ankle fusion.    PLAN:  #DVT Prophylaxis with Lovenox 40mg sq daily.  #LLE: WBAT with CAM boot only.  #F/U Pending BMP  #Continue treatment with 1250mg q12hr.  #Continue pain management with Oxycodone 15 mg PO PRN q 4 hours for severe pain and 10 mg PO q4hrs PRN for moderate pain.   #Appreciate Infectious Disease note.  #Consent for PICC as per HARRIS Garcia  Team [facility] [phone] PROGRESS NOTE. POD #3    SUBJECTIVE: Patient is seen in bed resting comfortably and without any new complaints overnight. Patient states that he is feeling improved since yesterday noting that he has begun to regain sensation to the digits of his left foot. He says that he was also able to walk around the floor with physical therapy yesterday without complication.     OBJECTIVE:  Vital Signs Last 24 Hrs  T(C): 37.2 (08 Aug 2019 05:39), Max: 37.5 (08 Aug 2019 00:15)  T(F): 98.9 (08 Aug 2019 05:39), Max: 99.5 (08 Aug 2019 00:15)  HR: 94 (08 Aug 2019 05:39) (62 - 96)  BP: 122/73 (08 Aug 2019 05:39) (116/71 - 136/83)  BP(mean): --  RR: 18 (08 Aug 2019 05:39) (16 - 18)  SpO2: 98% (08 Aug 2019 05:39) (94% - 98%)    #BMP PENDING    PHYSICAL EXAM:  GENERAL: Well Appearing, No acute Distress.  EXT: LLE  #ACE Bandage in place. C/D/I  #Calfs are soft, warm, nontender.  #Dull Sensation grossly intact to touch.   #Strong Muscle Strength  #2+Dorsalis Pedis Pulse    Assessment:    49 Y/O male admitted with infected left ankle fusion.    PLAN:  #DVT Prophylaxis with Lovenox 40mg sq daily.  #LLE: WBAT with CAM boot only.  #F/U Pending BMP  #Continue treatment with 1250mg q12hr.  #Continue pain management with Oxycodone 15 mg PO PRN q 4 hours for severe pain and 10 mg PO q4hrs PRN for moderate pain.   #Appreciate Infectious Disease note.  #Consent for PICC as per RUSSEL Garcia-S  Team [facility]opa[facility] [phone]     RUSSEL Hull  Orthopedic Surgery  [phone]

## 2019-08-09 ENCOUNTER — TRANSCRIPTION ENCOUNTER (OUTPATIENT)
Age: 49
End: 2019-08-09

## 2019-08-09 LAB
CULTURE RESULTS: SIGNIFICANT CHANGE UP
CULTURE RESULTS: SIGNIFICANT CHANGE UP
SPECIMEN SOURCE: SIGNIFICANT CHANGE UP
SPECIMEN SOURCE: SIGNIFICANT CHANGE UP

## 2019-08-09 RX ORDER — CEFAZOLIN SODIUM 1 G
2 VIAL (EA) INJECTION
Qty: 230 | Refills: 0
Start: 2019-08-09 | End: 2019-09-15

## 2019-08-09 RX ORDER — METRONIDAZOLE 500 MG
1 TABLET ORAL
Qty: 114 | Refills: 0
Start: 2019-08-09 | End: 2019-09-15

## 2019-08-09 RX ADMIN — Medication 100 MILLIGRAM(S): at 14:01

## 2019-08-09 RX ADMIN — MORPHINE SULFATE 15 MILLIGRAM(S): 50 CAPSULE, EXTENDED RELEASE ORAL at 23:30

## 2019-08-09 RX ADMIN — Medication 100 MILLIGRAM(S): at 21:08

## 2019-08-09 RX ADMIN — SENNA PLUS 2 TABLET(S): 8.6 TABLET ORAL at 21:09

## 2019-08-09 RX ADMIN — Medication 100 MILLIGRAM(S): at 05:37

## 2019-08-09 RX ADMIN — Medication 10 MILLIGRAM(S): at 10:59

## 2019-08-09 RX ADMIN — Medication 650 MILLIGRAM(S): at 17:44

## 2019-08-09 RX ADMIN — Medication 500 MILLIGRAM(S): at 17:41

## 2019-08-09 RX ADMIN — Medication 1 MILLIGRAM(S): at 12:54

## 2019-08-09 RX ADMIN — Medication 500 MILLIGRAM(S): at 22:26

## 2019-08-09 RX ADMIN — TRAMADOL HYDROCHLORIDE 50 MILLIGRAM(S): 50 TABLET ORAL at 06:08

## 2019-08-09 RX ADMIN — Medication 500 MILLIGRAM(S): at 05:39

## 2019-08-09 RX ADMIN — Medication 500 MILLIGRAM(S): at 05:38

## 2019-08-09 RX ADMIN — MORPHINE SULFATE 15 MILLIGRAM(S): 50 CAPSULE, EXTENDED RELEASE ORAL at 22:26

## 2019-08-09 RX ADMIN — Medication 1 TABLET(S): at 12:54

## 2019-08-09 RX ADMIN — MORPHINE SULFATE 15 MILLIGRAM(S): 50 CAPSULE, EXTENDED RELEASE ORAL at 12:54

## 2019-08-09 RX ADMIN — TRAMADOL HYDROCHLORIDE 50 MILLIGRAM(S): 50 TABLET ORAL at 05:38

## 2019-08-09 RX ADMIN — MORPHINE SULFATE 15 MILLIGRAM(S): 50 CAPSULE, EXTENDED RELEASE ORAL at 17:09

## 2019-08-09 RX ADMIN — Medication 650 MILLIGRAM(S): at 18:36

## 2019-08-09 RX ADMIN — ENOXAPARIN SODIUM 40 MILLIGRAM(S): 100 INJECTION SUBCUTANEOUS at 05:41

## 2019-08-09 RX ADMIN — PANTOPRAZOLE SODIUM 40 MILLIGRAM(S): 20 TABLET, DELAYED RELEASE ORAL at 05:38

## 2019-08-09 RX ADMIN — ONDANSETRON 4 MILLIGRAM(S): 8 TABLET, FILM COATED ORAL at 17:44

## 2019-08-09 RX ADMIN — Medication 100 MILLIGRAM(S): at 21:09

## 2019-08-09 RX ADMIN — CHLORHEXIDINE GLUCONATE 1 APPLICATION(S): 213 SOLUTION TOPICAL at 12:54

## 2019-08-09 RX ADMIN — Medication 100 MILLIGRAM(S): at 05:38

## 2019-08-09 RX ADMIN — Medication 500 MILLIGRAM(S): at 14:01

## 2019-08-09 NOTE — PROGRESS NOTE ADULT - ASSESSMENT
49 y/oM s/p I&D, removal of hardware, abx beads left ankle, IV ABX, via PICC, d/c home once home care arranged  Harper Corey PA-C  Orthopaedic Surgery  Team pager [phone]  [facility] [phone]  [phone]

## 2019-08-09 NOTE — PROGRESS NOTE ADULT - SUBJECTIVE AND OBJECTIVE BOX
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  No chest pain, SOB, N/V.    T(C): 36.9 (08-09-19 @ 05:26), Max: 37.3 (08-08-19 @ 21:00)  HR: 96 (08-09-19 @ 05:26) (67 - 96)  BP: 118/71 (08-09-19 @ 05:26) (116/79 - 146/81)  RR: 18 (08-09-19 @ 05:26) (18 - 18)  SpO2: 96% (08-09-19 @ 05:26) (95% - 98%)      Exam:  Alert and Oriented, No Acute Distress  Cards: +S1/S2, RRR  Pulm: CTAB  Lower Extremities:LLE dressing CDI, moving digits, unable to tolerated cam boot  Calves Soft, Non-tender bilaterally  +PF/DF/EHL/FHL  SILT  +DP Pulse                       14.3   9.0   )-----------( 300      ( 07 Aug 2019 07:10 )             42.0    08-08  135  |  92<L>  |  8   ----------------------------<  111<H>  3.8   |  31  |  1.03  Ca    10.4      08 Aug 2019 07:22

## 2019-08-09 NOTE — DISCHARGE NOTE NURSING/CASE MANAGEMENT/SOCIAL WORK - NSSCTYPOFSERV_GEN_ALL_CORE
Darci Myers is a 43 year old male here for  Chief Complaint   Patient presents with   • Cancer     Denies latex allergy or sensitivity.    Medication verified and med list updated.  PCP and Pharmacy verified.    Social History     Tobacco Use   Smoking Status Never Smoker   Smokeless Tobacco Current User   • Types: Chew     Advance Directives Filed: No    ECO - Fully active, able to carry on all predisease activities without restrictions.    Height: No.  Weight:Yes, shoes on.    REVIEW OF SYSTEMS  GENERAL:  Patient denies headache, fevers, chills, night sweats, change in appetite, weight loss, dizziness, but complains of: excessive fatigue  ALLERGIC/IMMUNOLOGIC: Verified allergies: No  EYES:  Patient denies significant visual difficulties, double vision, blurred vision  ENT/MOUTH: Patient denies problems with hearing, sore throat, sinus drainage, mouth sores  ENDOCRINE:  Patient denies diabetes, thyroid disease, hormone replacement, hot flashes  HEMATOLOGIC/LYMPHATIC: Patient denies easy bruising, bleeding, tender lymph nodes, swollen lymph nodes  BREASTS: Patient denies abnormal masses of breast, nipple discharge, pain  RESPIRATORY:  Patient denies lung pain with breathing, coughing up blood, shortness of breath, but complains of: cough  CARDIOVASCULAR:  Patient denies anginal chest pain, palpitations, shortness of breath when lying flat, peripheral edema  GASTROINTESTINAL: Patient denies abdominal pain , nausea, vomiting, diarrhea, GI bleeding, constipation, change in bowel habits, heartburn, sensation of feeling full, difficulty swallowing  : Patient denies blood in the urine, burning with urination, frequency, urgency, hesitancy, incontinence  MUSCULOSKELETAL:  Patient denies bone pain, joint swelling, redness, decreased range of motion, but complains of: left shoulder pain  SKIN:  Patient denies chronic rashes, inflammation, ulcerations, skin changes, itching  NEUROLOGIC:  Patient denies loss of  balance, areas of focal weakness, abnormal gait, sensory problems, tingling, but complains of: tingling in the fingers and the feet  PSYCHIATRIC: Patient denies depression, anxiety, but complains of: insomnia   Home IV infusion via PICC   SOC 2pm 8/10 in patient's home   Wife will learn infusion
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
## 2019-08-09 NOTE — DISCHARGE NOTE NURSING/CASE MANAGEMENT/SOCIAL WORK - NSDCDPATPORTLINK_GEN_ALL_CORE
You can access the Car in the [facility] Portal, offered by [facility], by registering with the following website: [email or web address]

## 2019-08-09 NOTE — PROGRESS NOTE ADULT - ATTENDING COMMENTS
No medical complications post-op to date and to proceed with physical therapy, as tolerated. Continues pulmonary toilet to lessen atelectasis, leg exercises as taught to lessen the risk of DVT and supervised pain medications for post-op pain control.  The patient had  removal of hardware 08/05/19 and is on IV  Vancomycin for osteomyelitis. DC planning home with long course of abx

## 2019-08-10 VITALS
SYSTOLIC BLOOD PRESSURE: 115 MMHG | TEMPERATURE: 98 F | HEART RATE: 88 BPM | OXYGEN SATURATION: 94 % | DIASTOLIC BLOOD PRESSURE: 71 MMHG | RESPIRATION RATE: 18 BRPM

## 2019-08-10 LAB
-  AMOXICILLIN/CLAVULANIC ACID: SIGNIFICANT CHANGE UP
-  CLINDAMYCIN: SIGNIFICANT CHANGE UP
-  IMIPENEM: SIGNIFICANT CHANGE UP
-  METRONIDAZOLE: SIGNIFICANT CHANGE UP
ALBUMIN SERPL ELPH-MCNC: 3.9 G/DL — SIGNIFICANT CHANGE UP (ref 3.3–5)
ALP SERPL-CCNC: 136 U/L — HIGH (ref 40–120)
ALT FLD-CCNC: 59 U/L — HIGH (ref 10–45)
ANION GAP SERPL CALC-SCNC: 13 MMOL/L — SIGNIFICANT CHANGE UP (ref 5–17)
AST SERPL-CCNC: 27 U/L — SIGNIFICANT CHANGE UP (ref 10–40)
BILIRUB SERPL-MCNC: 0.4 MG/DL — SIGNIFICANT CHANGE UP (ref 0.2–1.2)
BUN SERPL-MCNC: 11 MG/DL — SIGNIFICANT CHANGE UP (ref 7–23)
CALCIUM SERPL-MCNC: 10.3 MG/DL — SIGNIFICANT CHANGE UP (ref 8.4–10.5)
CHLORIDE SERPL-SCNC: 95 MMOL/L — LOW (ref 96–108)
CO2 SERPL-SCNC: 28 MMOL/L — SIGNIFICANT CHANGE UP (ref 22–31)
CREAT SERPL-MCNC: 0.9 MG/DL — SIGNIFICANT CHANGE UP (ref 0.5–1.3)
CULTURE RESULTS: SIGNIFICANT CHANGE UP
GLUCOSE SERPL-MCNC: 97 MG/DL — SIGNIFICANT CHANGE UP (ref 70–99)
METHOD TYPE: SIGNIFICANT CHANGE UP
ORGANISM # SPEC MICROSCOPIC CNT: SIGNIFICANT CHANGE UP
POTASSIUM SERPL-MCNC: 4 MMOL/L — SIGNIFICANT CHANGE UP (ref 3.5–5.3)
POTASSIUM SERPL-SCNC: 4 MMOL/L — SIGNIFICANT CHANGE UP (ref 3.5–5.3)
PROT SERPL-MCNC: 8 G/DL — SIGNIFICANT CHANGE UP (ref 6–8.3)
SODIUM SERPL-SCNC: 136 MMOL/L — SIGNIFICANT CHANGE UP (ref 135–145)
SPECIMEN SOURCE: SIGNIFICANT CHANGE UP

## 2019-08-10 PROCEDURE — 36569 INSJ PICC 5 YR+ W/O IMAGING: CPT

## 2019-08-10 PROCEDURE — 87070 CULTURE OTHR SPECIMN AEROBIC: CPT

## 2019-08-10 PROCEDURE — 71045 X-RAY EXAM CHEST 1 VIEW: CPT

## 2019-08-10 PROCEDURE — 99285 EMERGENCY DEPT VISIT HI MDM: CPT

## 2019-08-10 PROCEDURE — 85610 PROTHROMBIN TIME: CPT

## 2019-08-10 PROCEDURE — 86901 BLOOD TYPING SEROLOGIC RH(D): CPT

## 2019-08-10 PROCEDURE — 85652 RBC SED RATE AUTOMATED: CPT

## 2019-08-10 PROCEDURE — 85027 COMPLETE CBC AUTOMATED: CPT

## 2019-08-10 PROCEDURE — 73700 CT LOWER EXTREMITY W/O DYE: CPT

## 2019-08-10 PROCEDURE — 73720 MRI LWR EXTREMITY W/O&W/DYE: CPT

## 2019-08-10 PROCEDURE — 97165 OT EVAL LOW COMPLEX 30 MIN: CPT

## 2019-08-10 PROCEDURE — 76377 3D RENDER W/INTRP POSTPROCES: CPT

## 2019-08-10 PROCEDURE — C1769: CPT

## 2019-08-10 PROCEDURE — 80202 ASSAY OF VANCOMYCIN: CPT

## 2019-08-10 PROCEDURE — 86140 C-REACTIVE PROTEIN: CPT

## 2019-08-10 PROCEDURE — 86900 BLOOD TYPING SEROLOGIC ABO: CPT

## 2019-08-10 PROCEDURE — 80053 COMPREHEN METABOLIC PANEL: CPT

## 2019-08-10 PROCEDURE — 76000 FLUOROSCOPY <1 HR PHYS/QHP: CPT

## 2019-08-10 PROCEDURE — 87186 SC STD MICRODIL/AGAR DIL: CPT

## 2019-08-10 PROCEDURE — 87040 BLOOD CULTURE FOR BACTERIA: CPT

## 2019-08-10 PROCEDURE — 97161 PT EVAL LOW COMPLEX 20 MIN: CPT

## 2019-08-10 PROCEDURE — 80048 BASIC METABOLIC PNL TOTAL CA: CPT

## 2019-08-10 PROCEDURE — 93005 ELECTROCARDIOGRAM TRACING: CPT

## 2019-08-10 PROCEDURE — A9585: CPT

## 2019-08-10 PROCEDURE — 81003 URINALYSIS AUTO W/O SCOPE: CPT

## 2019-08-10 PROCEDURE — 86850 RBC ANTIBODY SCREEN: CPT

## 2019-08-10 PROCEDURE — 87102 FUNGUS ISOLATION CULTURE: CPT

## 2019-08-10 PROCEDURE — C1713: CPT

## 2019-08-10 PROCEDURE — 85730 THROMBOPLASTIN TIME PARTIAL: CPT

## 2019-08-10 PROCEDURE — C1751: CPT

## 2019-08-10 RX ORDER — ASPIRIN/CALCIUM CARB/MAGNESIUM 324 MG
1 TABLET ORAL
Qty: 84 | Refills: 0
Start: 2019-08-10 | End: 2019-09-20

## 2019-08-10 RX ORDER — OXYCODONE HYDROCHLORIDE 5 MG/1
1 TABLET ORAL
Qty: 40 | Refills: 0
Start: 2019-08-10

## 2019-08-10 RX ORDER — MORPHINE SULFATE 50 MG/1
1 CAPSULE, EXTENDED RELEASE ORAL
Qty: 14 | Refills: 0
Start: 2019-08-10

## 2019-08-10 RX ADMIN — Medication 500 MILLIGRAM(S): at 06:35

## 2019-08-10 RX ADMIN — Medication 500 MILLIGRAM(S): at 05:57

## 2019-08-10 RX ADMIN — TRAMADOL HYDROCHLORIDE 50 MILLIGRAM(S): 50 TABLET ORAL at 06:00

## 2019-08-10 RX ADMIN — PANTOPRAZOLE SODIUM 40 MILLIGRAM(S): 20 TABLET, DELAYED RELEASE ORAL at 05:57

## 2019-08-10 RX ADMIN — Medication 100 MILLIGRAM(S): at 05:57

## 2019-08-10 RX ADMIN — ENOXAPARIN SODIUM 40 MILLIGRAM(S): 100 INJECTION SUBCUTANEOUS at 05:57

## 2019-08-10 RX ADMIN — TRAMADOL HYDROCHLORIDE 50 MILLIGRAM(S): 50 TABLET ORAL at 06:30

## 2019-08-10 NOTE — PROGRESS NOTE ADULT - ASSESSMENT
Assessment: stable    Plan:   for d/c today  Cont PIC Cont Abx  Follow up Katheryn Diaz and Madeleine (ID)

## 2019-08-10 NOTE — PROGRESS NOTE ADULT - NSHPATTENDINGPLANDISCUSS_GEN_ALL_CORE
the patient and the orthopedic resident

## 2019-08-10 NOTE — PROGRESS NOTE ADULT - REASON FOR ADMISSION
osteo left leg
Infected/Nonunion L Ankle Fusion
infected ankle hardware
infected ankle hardware
osteo of the left ankle
L ankle draining wound
Left ankle infected non-union ankle fusion
Left ankle infected non-union ankle fusion
Detail Level: Detailed

## 2019-08-10 NOTE — PROGRESS NOTE ADULT - SUBJECTIVE AND OBJECTIVE BOX
Ortho POD [5]     Patient is a 49y old  Male who presents with a chief complaint of osteo left leg /left ankle infected non-union fusion   Patient s/p I&D, removal of hardware, abx beads left ankle, IV ABX  Patient resting without complaints.  No chest pain, SOB, N/V.  Received AM dose of Abx    ICU Vital Signs Last 24 Hrs  T(C): 36.7 (10 Aug 2019 05:07), Max: 36.7 (09 Aug 2019 09:07)  T(F): 98 (10 Aug 2019 05:07), Max: 98.1 (09 Aug 2019 09:07)  HR: 88 (10 Aug 2019 05:07) (67 - 89)  BP: 115/71 (10 Aug 2019 05:07) (115/71 - 128/79)  BP(mean): --  ABP: --  ABP(mean): --  RR: 18 (10 Aug 2019 05:07) (18 - 18)  SpO2: 94% (10 Aug 2019 05:07) (94% - 99%)      Exam:  Alert and Oriented, No Acute Distress  Cards: +S1/S2, RRR  Pulm: CTAB  :LLE  ACE dsg CHANGED  I, moving digits, unable to tolerated cam boot  Calves Soft, Non-tender bilaterally  +PF/DF/EHL/FHL  sensation grossly in tact  +DP Pulse              08-10    136  |  95<L>  |  11  ----------------------------<  97  4.0   |  28  |  0.90    Ca    10.3      10 Aug 2019 06:13    TPro  8.0  /  Alb  3.9  /  TBili  0.4  /  DBili  x   /  AST  27  /  ALT  59<H>  /  AlkPhos  136<H>  08-10

## 2019-08-10 NOTE — PROGRESS NOTE ADULT - PROBLEM SELECTOR PROBLEM 1
Infected abrasion of left ankle, initial encounter
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
## 2019-08-10 NOTE — PROGRESS NOTE ADULT - PROVIDER SPECIALTY LIST ADULT
Infectious Disease
Internal Medicine
Orthopedics
Infectious Disease
Internal Medicine

## 2019-08-10 NOTE — PROGRESS NOTE ADULT - ASSESSMENT
48 yo male with a hx of an ankle fx. Presents  seen s/p removal of hardware.  The patient had  removal of hardware 08/05/19 and is on IV Ancef and po flagyl for osteomyelitis,  Patient potential discharge today

## 2019-08-10 NOTE — PROGRESS NOTE ADULT - ATTENDING COMMENTS
No medical complications post-op to date and to proceed with physical therapy, as tolerated. Continues pulmonary toilet to lessen atelectasis, leg exercises as taught to lessen the risk of DVT and supervised pain medications for post-op pain control.  The patient had  removal of hardware 08/05/19 and is on IV  Vancomycin for osteomyelitis changed to IV Ancef and po Flagyl. DC planning home with long course of antibiotics.

## 2019-08-10 NOTE — PROGRESS NOTE ADULT - PROBLEM SELECTOR PLAN 2
pain meds as needed  Follow for oversedation  GI regimen to prevent constipation

## 2019-08-10 NOTE — PROGRESS NOTE ADULT - PROBLEM SELECTOR PLAN 1
Patient s/p removal of hardware  pain well controlled  DVT and GI prophylaxis  PO as tolerated  Iv anti  weight bearing as per ortho  PICC line placed for prolonged course of abx
Patient s/p removal of hardware  pain meds as needed  DVT and GI prophylaxis  PO as tolerated
Patient s/p removal of hardware  pain well controlled  DVT and GI prophylaxis  PO as tolerated  weight bearing as per ortho  PICC line placed for prolonged course of abx
***See Above  Alex GOODE  Orthopedics  B: [id number]  S: [id number]
Patient s/p removal of hardware  pain meds as needed  DVT and GI prophylaxis  PO as tolerated
Patient s/p removal of hardware  pain meds as needed  DVT and GI prophylaxis  PO as tolerated  follow cultures Patient may need a PICC line for a prolonged course of abx
Patient s/p removal of hardware  pain well controlled  DVT and GI prophylaxis  PO as tolerated  Iv anti  weight bearing as per ortho  PICC line placed for prolonged course of abx

## 2019-08-12 LAB
CULTURE RESULTS: SIGNIFICANT CHANGE UP
SPECIMEN SOURCE: SIGNIFICANT CHANGE UP

## 2019-08-20 ENCOUNTER — APPOINTMENT (OUTPATIENT)
Dept: ORTHOPEDIC SURGERY | Facility: CLINIC | Age: 49
End: 2019-08-20
Payer: COMMERCIAL

## 2019-08-20 PROCEDURE — 99024 POSTOP FOLLOW-UP VISIT: CPT

## 2019-08-28 ENCOUNTER — APPOINTMENT (OUTPATIENT)
Dept: ORTHOPEDIC SURGERY | Facility: CLINIC | Age: 49
End: 2019-08-28
Payer: COMMERCIAL

## 2019-08-28 PROCEDURE — 99024 POSTOP FOLLOW-UP VISIT: CPT

## 2019-09-04 LAB

## 2019-09-18 ENCOUNTER — APPOINTMENT (OUTPATIENT)
Dept: ORTHOPEDIC SURGERY | Facility: CLINIC | Age: 49
End: 2019-09-18
Payer: COMMERCIAL

## 2019-09-18 PROCEDURE — 99024 POSTOP FOLLOW-UP VISIT: CPT

## 2019-09-18 PROCEDURE — 73610 X-RAY EXAM OF ANKLE: CPT | Mod: LT

## 2019-09-30 RX ORDER — IBUPROFEN 800 MG/1
800 TABLET, FILM COATED ORAL EVERY 6 HOURS
Qty: 80 | Refills: 2 | Status: ACTIVE | COMMUNITY
Start: 2019-09-30 | End: 1900-01-01

## 2019-10-02 ENCOUNTER — APPOINTMENT (OUTPATIENT)
Dept: ORTHOPEDIC SURGERY | Facility: CLINIC | Age: 49
End: 2019-10-02
Payer: COMMERCIAL

## 2019-10-02 DIAGNOSIS — S82.872F: ICD-10-CM

## 2019-10-02 PROCEDURE — 99024 POSTOP FOLLOW-UP VISIT: CPT

## 2019-10-02 PROCEDURE — 73610 X-RAY EXAM OF ANKLE: CPT | Mod: LT

## 2019-10-02 RX ORDER — RIFAMPIN 300 MG/1
300 CAPSULE ORAL
Qty: 28 | Refills: 1 | Status: ACTIVE | COMMUNITY
Start: 2019-10-02 | End: 1900-01-01

## 2019-10-02 RX ORDER — SULFAMETHOXAZOLE AND TRIMETHOPRIM 800; 160 MG/1; MG/1
800-160 TABLET ORAL TWICE DAILY
Qty: 28 | Refills: 1 | Status: ACTIVE | COMMUNITY
Start: 2019-10-02 | End: 1900-01-01

## 2019-10-04 PROBLEM — S82.872F: Status: ACTIVE | Noted: 2018-11-13

## 2019-10-16 ENCOUNTER — APPOINTMENT (OUTPATIENT)
Dept: ORTHOPEDIC SURGERY | Facility: CLINIC | Age: 49
End: 2019-10-16

## 2019-10-27 NOTE — OCCUPATIONAL THERAPY INITIAL EVALUATION ADULT - ADL RETRAINING, OT EVAL
Speech Therapy  Communication/Cognition Evaluation and Treatment     RECOMMENDATIONS:   Patient demonstrates acute communication and cognitive deficits, will initiate speech therapy.     Recommendations discussed with patient, family,  RN who was informed of results of session     ASSESSMENT:   The patient was seen on 10/27/19 for communication and cognition evaluation.     The patient presents with moderate and severe impairments in attention/concentration, insight/awareness and orientation which impact safe and efficient communication/cognition, expression of ideas/needs, medication management and IADLs. The patient is currently needing moderate cueing, supervision and modified diet for communication/cognition, expression of ideas/needs, comprehension, medication management and IADLs.    Patient required frequent cues to maintain attention to task.  Yes/No is not reliable at this time for biographical or simple concrete questions.  Verbal output was limited.  Speech was intelligible, verbal communication of immediate wants/needs was intact via use of short phrases.  Evaluation was completed through use of family (son)  .    Further skilled speech therapy services are reasonable and necessary to address the above impairments and performance deficits     Prior Communication/Cognition:  Prior Cognition: Intact  Prior Auditory Comprehension: Intact  Prior Verbal Expression: Intact  Prior Reading: Intact  Prior Writing: Intact  Prior Memory: Intact        EDUCATION:   On this date, the patient, patient's spouse and family member was educated on results of evaluation, rationale for treatment and goals.    The response to education was: Verbalizes understanding and Needs reinforcement    Plan for Next Session:  Plan for Next Session: speech/language/cognitive treatment   Dysphagia management/treatment    Frequency:  Frequency: 5 sessions    Barriers to Discharge:   SLP Identified Barriers to Discharge:  reduced cognition    Recommendations for Discharge:   Continued SLP services    Subjective/Objective:  Communication/Cognition:  Behavior/Social Interaction  Arousal and Alertness: Inconsistent responses to stimuli  Cooperates with Tasks: Moderate impairment  Maintains Eye Contact: Moderate impairment  Frustration Level: Moderate impairment; patient unhappy with soft restraints    Verbal Expression  Initiation: Impaired; verbal output limited  Expressive Language: limited assessment 2/2 reduce participation.  Speech was intelligible; communication of immediate wants/needs intact.      Auditory Comprehension  Yes/No Questions : Biographical: 50%                                    Shelley relating to self and current environment: 50%    Cognition/Orientation/Memory  Orientation Level:  Impaired  Oriented to Person: Yes  Oriented to Place: No  Oriented to Month: No  Oriented to Year: No  Oriented to Date: No  Oriented to Day of Week: No  Oriented to Reason for Hospitilization: No  Oriented to Length of Stay: No  Orientation Level Comments: unable to recall number or names of children     Objective Tests  Objective Tests 1: (informal assessment and observations)      GOALS:  Long Term Goals  LTG  #1:  Patient will reliably communicate basic information relating to self and current environment in short adult conversation.    LTG #2:  Patient will be oriented x4 following initial review.    Short Term Goals  STG #1:  Patient will be oriented x4 after initial review; cues prn.     STG #2: Patient will answer yes/no questions relating to self and current environment with 90% accuracy.     STG #3:  Patient will participate in further evaluations of speech/languale/cognitive evaluative task with additional goals to follow.         Total Treatment Time:  SLP Time Spent: 30 min     Patient will dress lower body independently AE as needed in 2 weeks

## 2019-11-19 ENCOUNTER — APPOINTMENT (OUTPATIENT)
Dept: ORTHOPEDIC SURGERY | Facility: CLINIC | Age: 49
End: 2019-11-19

## 2019-12-02 NOTE — DISCHARGE NOTE PROVIDER - NSDCQMERRANDS_GEN_ALL_CORE
No Patient states that since she has been on this medication she has been having muscle aches-specifically in in her lower extremities which she describes as a tightness mostly at night. Wondering if she should try a different medication to lower her cholesterol. Did have similar issues with other statin medications. Is also on CoQ10. Patient does state that she is able to deal with the side effects if necessary as she would rather not have a heart attack.

## 2021-02-10 NOTE — PHYSICAL THERAPY INITIAL EVALUATION ADULT - PRECAUTIONS/LIMITATIONS, REHAB EVAL
Detail Level: Detailed Quality 110: Preventive Care And Screening: Influenza Immunization: Influenza Immunization previously received during influenza season fall precautions/surgical precautions
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
## 2021-08-05 NOTE — DIETITIAN INITIAL EVALUATION ADULT. - NS AS NUTRI INTERV MEALS SNACK
Outpatient Oncology Care Plan  Problem list:  knowledge deficit    Problems related to:    disease/disease progression    Interventions:  provided general teaching    Expected outcomes:  understands plan of care    Progress towards outcome:  resolved    Ed General/healthful diet/Continue current diet as tolerated. Encourage PO intake of protein-rich, nutrient dense foods.

## 2022-07-19 PROBLEM — H40.003 GLAUCOMA SUSPECT OF BOTH EYES: Status: ACTIVE | Noted: 2017-01-25

## 2023-09-11 NOTE — OCCUPATIONAL THERAPY INITIAL EVALUATION ADULT - BED MOBILITY/TRANSFERS, PREVIOUS LEVEL OF FUNCTION, OT EVAL
Critically high potassium received from lab. EKG done. Pt denies CP. Pt endorsed he had CP earlier in the day with PT at work, CP has since resolved. Pt in NAD att.         Leslie Tanner, SACHIN  09/11/23 [id number] independent

## 2024-06-20 NOTE — ED PROVIDER NOTE - NSTIMEPROVIDERCAREINITIATE_GEN_ER
----- Message from Mischa Mendel, MA sent at 6/19/2024  3:55 PM CDT -----  Contact: [email or web address]@ [phone]    ----- Message -----  From: Hesham Mckay MA  Sent: 6/19/2024   3:16 PM CDT  To: [facility] Staff    Pt called                  Pt is requesting a call back to speak with provider to discuss blood work results.   03-Aug-2019 18:29
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
